# Patient Record
Sex: MALE | Race: WHITE | NOT HISPANIC OR LATINO | Employment: OTHER | URBAN - METROPOLITAN AREA
[De-identification: names, ages, dates, MRNs, and addresses within clinical notes are randomized per-mention and may not be internally consistent; named-entity substitution may affect disease eponyms.]

---

## 2017-01-09 ENCOUNTER — PROCEDURE VISIT (OUTPATIENT)
Dept: DERMATOLOGY | Facility: CLINIC | Age: 72
End: 2017-01-09
Payer: MEDICARE

## 2017-01-09 VITALS
DIASTOLIC BLOOD PRESSURE: 81 MMHG | BODY MASS INDEX: 20.4 KG/M2 | HEART RATE: 71 BPM | WEIGHT: 130 LBS | HEIGHT: 67 IN | SYSTOLIC BLOOD PRESSURE: 167 MMHG

## 2017-01-09 DIAGNOSIS — D22.30 ATYPICAL NEVUS OF FACE: ICD-10-CM

## 2017-01-09 DIAGNOSIS — D03.30 MELANOMA IN SITU OF FACE: Primary | ICD-10-CM

## 2017-01-09 PROCEDURE — 88305 TISSUE EXAM BY PATHOLOGIST: CPT | Performed by: PATHOLOGY

## 2017-01-09 PROCEDURE — 11643 EXC F/E/E/N/L MAL+MRG 2.1-3: CPT | Mod: S$PBB,,, | Performed by: DERMATOLOGY

## 2017-01-09 PROCEDURE — 99499 UNLISTED E&M SERVICE: CPT | Mod: S$PBB,,, | Performed by: DERMATOLOGY

## 2017-01-09 PROCEDURE — 11643 EXC F/E/E/N/L MAL+MRG 2.1-3: CPT | Mod: PBBFAC | Performed by: DERMATOLOGY

## 2017-01-09 RX ORDER — OXYCODONE AND ACETAMINOPHEN 5; 325 MG/1; MG/1
1 TABLET ORAL
Qty: 20 TABLET | Refills: 0 | Status: SHIPPED | OUTPATIENT
Start: 2017-01-09 | End: 2018-05-14

## 2017-01-09 NOTE — PROGRESS NOTES
PROCEDURE: 360-degree continuous en face margin-controlled excision with rush permanent sections and subsequent delayed repair    REFERRING MD: Bell Padilla M.D.    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 The patient elected to proceed.    PROCEDURE:  The area involved was marked out with a surgical marking pen. The area of left mandible was prepped, draped, and anesthetized in the usual sterile fashion. Lesional tissue was carefully marked with at least 5 mm margins of clinically normal skin in all directions. An excision was performed with a #15 blade carried down completely through the dermis to the deep subcutaneous tissue plane. A short suture was placed superiorly at the 12 o'clock position and a long suture was placed posteriorly at the 3 o'clock position. The lesion was then removed in total and submitted for histological margin evaluation. Then, electrocoagulation was used to obtain hemostasis. Blood loss was minimal.    The patient tolerated the procedure well.    The area was cleaned and dressed appropriately and the patient was given wound care instructions, as well as an appointment for follow-up evaluation tomorrow for possible further excision pending  "pathology report. Patient was placed on Percocet 5 prn postop pain.    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   "

## 2017-01-09 NOTE — MR AVS SNAPSHOT
[facility] - Dermatology Surgery  [address]  [facility] [address]  Phone: [phone]  Fax: [phone]                  Gabriele JUAREZ Back   2017 8:00 AM   Appointment    Description:  Male : 1945   Provider:  Nick Villarreal MD   Department:  [facility] - Dermatology Surgery                To Do List           Future Appointments        Provider Department Dept Phone    2017 8:00 AM Nick Villarreal MD [facility] - Dermatology Surgery [phone]      Goals (5 Years of Data)     None      [facility]     [facility] -  Assistance  Registered nurses in the [facility] provide clinical advisement, health education, appointment booking, and other advisory services.  Call for this free service at [phone].             Medications           Message regarding Medications     Verify the changes and/or additions to your medication regime listed below are the same as discussed with your clinician today.  If any of these changes or additions are incorrect, please notify your healthcare provider.             Verify that the below list of medications is an accurate representation of the medications you are currently taking.  If none reported, the list may be blank. If incorrect, please contact your healthcare provider. Carry this list with you in case of emergency.           Current Medications     multivitamin capsule Take 1 capsule by mouth once daily.    oxycodone-acetaminophen (PERCOCET) 5-325 mg per tablet Take 1 tablet by mouth every 4 to 6 hours as needed for Pain.           Clinical Reference Information           Allergies as of 2017     No Known Allergies      Immunizations Administered on Date of Encounter - 2017     None

## 2017-01-10 ENCOUNTER — PROCEDURE VISIT (OUTPATIENT)
Dept: DERMATOLOGY | Facility: CLINIC | Age: 72
End: 2017-01-10
Payer: MEDICARE

## 2017-01-10 VITALS — SYSTOLIC BLOOD PRESSURE: 151 MMHG | DIASTOLIC BLOOD PRESSURE: 85 MMHG | HEART RATE: 71 BPM

## 2017-01-10 DIAGNOSIS — D03.30 MELANOMA IN SITU OF FACE: Primary | ICD-10-CM

## 2017-01-10 PROCEDURE — 13132 CMPLX RPR F/C/C/M/N/AX/G/H/F: CPT | Mod: 79,PBBFAC | Performed by: DERMATOLOGY

## 2017-01-10 PROCEDURE — 99499 UNLISTED E&M SERVICE: CPT | Mod: S$PBB,,, | Performed by: DERMATOLOGY

## 2017-01-10 PROCEDURE — 13132 CMPLX RPR F/C/C/M/N/AX/G/H/F: CPT | Mod: 79,S$PBB,, | Performed by: DERMATOLOGY

## 2017-01-10 NOTE — PROGRESS NOTES
PROCEDURE: Complex Linear Repair    INDICATION: Status post 360 degree continuous en face excision with 5 mm margins for melanoma in situ performed yesterday. Verbal results of residual melanoma in situ but with clear margins per Dr. Pike today.    REFERRING MD: Bell Padilla M.D.    SURGEON: Nick Villarreal MD    ASSISTANTS: Paula Pedroza PA-C and Mikala Ga, Surg Tech    ANESTHETIC: 8 cc 1% Lidocaine with Epinephrine 1:100,000, buffered    SURGICAL PREP: Hibiclens    LOCATION: left mandible    DEFECT SIZE: 1.8 x 2.1 cm    WOUND REPAIR/DISPOSITION:  After the patient's tumor had been completely removed yesterday, a repair of the surgical defect was undertaken today. The patient was returned to the operating suite where the area of left mandible was prepped, draped, and anesthetized in the usual sterile fashion. The wound was widely undermined in all directions. Then, electrocoagulation was used to obtain meticulous hemostasis. 5-0 Vicryl buried vertical mattress sutures were placed into the subcutaneous and dermal plane to close the wound and jigar the cutaneous wound edge. Bilateral dog ears were identified and were removed by a standard Burow's triangle technique. The cutaneous wound edges were closed using interrupted 5-0 Prolene suture.    The patient tolerated the procedure well.    The area was cleaned and dressed appropriately and the patient was given wound care instructions, as well as appointment for follow-up evaluation. Pt already has Percocet 5 prn postop pain.    LENGTH OF REPAIR: 4.7 cm    Vitals:    01/10/17 1009 01/10/17 1117   BP: (!) 153/86 (!) 151/85   BP Location: Left arm Right arm   Patient Position: Sitting Sitting   BP Method: Automatic Automatic   Pulse: 75 71

## 2017-01-10 NOTE — MR AVS SNAPSHOT
[facility] - Dermatology Surgery  [address]  [facility] [address]  Phone: [phone]  Fax: [phone]                  Gabriele Hayes   1/10/2017 10:10 AM   Procedure visit    Description:  Male : 1945   Provider:  Nick Villarreal MD   Department:  [facility] - Dermatology Surgery           Reason for Visit     Melanoma                To Do List           Goals (5 Years of Data)     None      [facility]     [facility] -  Assistance  Registered nurses in the [facility] provide clinical advisement, health education, appointment booking, and other advisory services.  Call for this free service at [phone].             Medications           Message regarding Medications     Verify the changes and/or additions to your medication regime listed below are the same as discussed with your clinician today.  If any of these changes or additions are incorrect, please notify your healthcare provider.             Verify that the below list of medications is an accurate representation of the medications you are currently taking.  If none reported, the list may be blank. If incorrect, please contact your healthcare provider. Carry this list with you in case of emergency.           Current Medications     multivitamin capsule Take 1 capsule by mouth once daily.    oxycodone-acetaminophen (PERCOCET) 5-325 mg per tablet Take 1 tablet by mouth every 4 to 6 hours as needed for Pain.           Clinical Reference Information           Vital Signs - Last Recorded  Most recent update: 1/10/2017 11:17 AM by Mikala Ga    BP Pulse                (!) 151/85 (BP Location: Right arm, Patient Position: Sitting, BP Method: Automatic) 71          Blood Pressure          Most Recent Value    BP  (!)  151/85      Allergies as of 1/10/2017     No Known Allergies      Immunizations Administered on Date of Encounter - 1/10/2017     None

## 2017-01-10 NOTE — Clinical Note
Estelita- Will need letter to Dr Padilla when path report becomes available. Will need to state in that letter that pathology report revealed residual melanoma in situ instead of severely dysplastic nevus and that all margins are negative.

## 2017-01-12 ENCOUNTER — PATIENT MESSAGE (OUTPATIENT)
Dept: ADMINISTRATIVE | Facility: OTHER | Age: 72
End: 2017-01-12

## 2017-01-17 ENCOUNTER — OFFICE VISIT (OUTPATIENT)
Dept: DERMATOLOGY | Facility: CLINIC | Age: 72
End: 2017-01-17
Payer: MEDICARE

## 2017-01-17 DIAGNOSIS — Z09 POSTOP CHECK: Primary | ICD-10-CM

## 2017-01-17 PROCEDURE — 99212 OFFICE O/P EST SF 10 MIN: CPT | Mod: PBBFAC | Performed by: DERMATOLOGY

## 2017-01-17 PROCEDURE — 99999 PR PBB SHADOW E&M-EST. PATIENT-LVL II: CPT | Mod: PBBFAC,,, | Performed by: DERMATOLOGY

## 2017-01-17 PROCEDURE — 99024 POSTOP FOLLOW-UP VISIT: CPT | Mod: ,,, | Performed by: DERMATOLOGY

## 2017-01-17 NOTE — PROGRESS NOTES
71 y.o. male patient is here for suture removal following surgery.    Patient reports no problems with L mandible.    WOUND PE:  The L mandible sutures intact. Wound healing well. Good skin edges. No signs or symptoms of infection.    IMPRESSION:  Healing operative site from Status post 360 degree continuous en face excision with 5 mm margins for melanoma in situ, L mandible with CLC, postop day # 7.    PLAN:  Sutures removed today. Steri-strips applied.  D/c wound care.  Keep moist with Aquaphor.    RTC:  In 3 months with Dr Padilla for skin check or sooner if new concern arises.

## 2018-05-10 ENCOUNTER — TELEPHONE (OUTPATIENT)
Dept: DERMATOLOGY | Facility: CLINIC | Age: 73
End: 2018-05-10

## 2018-05-10 NOTE — TELEPHONE ENCOUNTER
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 Pt verbally confirmed appointment date and time.

## 2018-05-10 NOTE — TELEPHONE ENCOUNTER
----- Message from Rayna Gilbert sent at 5/10/2018  2:10 PM CDT -----  Contact: patient   Please call above patient need to speak with the nurse

## 2018-05-10 NOTE — TELEPHONE ENCOUNTER
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

## 2018-05-14 ENCOUNTER — PROCEDURE VISIT (OUTPATIENT)
Dept: DERMATOLOGY | Facility: CLINIC | Age: 73
End: 2018-05-14
Payer: MEDICARE

## 2018-05-14 VITALS
HEART RATE: 61 BPM | HEIGHT: 67 IN | WEIGHT: 130 LBS | SYSTOLIC BLOOD PRESSURE: 155 MMHG | BODY MASS INDEX: 20.4 KG/M2 | DIASTOLIC BLOOD PRESSURE: 79 MMHG

## 2018-05-14 DIAGNOSIS — C43.59 MALIGNANT MELANOMA OF BACK: Primary | ICD-10-CM

## 2018-05-14 PROCEDURE — 11604 EXC TR-EXT MAL+MARG 3.1-4 CM: CPT | Mod: 51,S$PBB,, | Performed by: DERMATOLOGY

## 2018-05-14 PROCEDURE — 88305 TISSUE EXAM BY PATHOLOGIST: CPT | Performed by: PATHOLOGY

## 2018-05-14 PROCEDURE — 13102 CMPLX RPR TRUNK ADDL 5CM/<: CPT | Mod: S$PBB,,, | Performed by: DERMATOLOGY

## 2018-05-14 PROCEDURE — 13101 CMPLX RPR TRUNK 2.6-7.5 CM: CPT | Mod: S$PBB,,, | Performed by: DERMATOLOGY

## 2018-05-14 PROCEDURE — 11604 EXC TR-EXT MAL+MARG 3.1-4 CM: CPT | Mod: PBBFAC | Performed by: DERMATOLOGY

## 2018-05-14 PROCEDURE — 99499 UNLISTED E&M SERVICE: CPT | Mod: S$PBB,,, | Performed by: DERMATOLOGY

## 2018-05-14 PROCEDURE — 13101 CMPLX RPR TRUNK 2.6-7.5 CM: CPT | Mod: PBBFAC | Performed by: DERMATOLOGY

## 2018-05-14 PROCEDURE — 13102 CMPLX RPR TRUNK ADDL 5CM/<: CPT | Mod: PBBFAC | Performed by: DERMATOLOGY

## 2018-05-14 RX ORDER — OXYCODONE AND ACETAMINOPHEN 5; 325 MG/1; MG/1
1 TABLET ORAL
Qty: 20 TABLET | Refills: 0 | Status: SHIPPED | OUTPATIENT
Start: 2018-05-14 | End: 2018-05-28

## 2018-05-14 RX ORDER — CEPHALEXIN 500 MG/1
500 CAPSULE ORAL 3 TIMES DAILY
Qty: 30 CAPSULE | Refills: 0 | Status: SHIPPED | OUTPATIENT
Start: 2018-05-14 | End: 2018-05-24

## 2018-05-14 NOTE — PROGRESS NOTES
PROCEDURE: Wide local excision of melanoma with complex linear repair    REFERRING MD: Bell Padilla M.D.    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 Patient verified location by looking at photo taken prior to procedure.     INITIAL LESION SIZE: 0.8 x 1.1 cm    EXCISED MARGINS: 1 cm    DEFECT SIZE: 2.8 x 3.4 cm    PREPARATION:  The diagnosis, procedure, alternatives, benefits and risks, including but not limited to: drug reactions, pain, scar or cosmetic defect, local sensation disturbances, and/or recurrence of present condition were explained to the patient. The patient elected to proceed.    PROCEDURE:  The area involved by the melanoma was marked out with a surgical marking pen. The area of right upper back was prepped, draped, and anesthetized in the usual sterile fashion. Lesional tissue was carefully marked with at least 1 cm margins of clinically normal skin in all directions. A fusiform elliptical excision was performed with a #10 blade carried down completely through the dermis to the deep subcutaneous tissue plane just above fascia/muscle. A short suture was placed superiorly at the 12 o' clock and a long suture was placed right laterally at the 3 o' clock position. The lesion was then removed in total and submitted for histological margin evaluation. The operative site was then extensively undermined in all directions in the subcutaneous tissue plane. Then, electrocoagulation was used to obtain hemostasis. Blood loss was minimal. The deep subcutaneous tissue plane was closed first with 3-0 Vicryl buried vertical mattress sutures in order to  "close off dead space. Then, a more superficial layer of 3-0 Vicryl and 4-0 Vicryl buried vertical mattress sutures was placed in the mid-dermal and subcutaneous tissue planes in order to approximate the wound edges. The cutaneous wound edges were closed using interrupted 4-0 Prolene sutures.    The patient tolerated the procedure well.    The area was cleaned and dressed appropriately and the patient was given wound care instructions, as well as an appointment for follow-up evaluation. Patient was placed on Percocet 5 prn postop pain and Keflex 500 mg TID x 10 days.    LENGTH OF REPAIR: 10.5 cm    Vitals:    05/14/18 1126 05/14/18 1304   BP: (!) 151/79 (!) 155/79   BP Location: Left arm    Patient Position: Sitting    BP Method: Medium (Automatic)    Pulse: 77 61   Weight: 59 kg (130 lb)    Height: 5' 7" (1.702 m)          "

## 2018-05-17 ENCOUNTER — TELEPHONE (OUTPATIENT)
Dept: DERMATOLOGY | Facility: CLINIC | Age: 73
End: 2018-05-17

## 2018-05-17 NOTE — TELEPHONE ENCOUNTER
Pt had surgery to remove MM on R upper back on 5/14/18. Pt was taking Keflex and c/o stomach cramps and insomnia. I advised him to d/c antibiotics and to make sure the area is properly cleaned daily and covered. Pt states the area looks great and denies any issues or problems.

## 2018-05-28 ENCOUNTER — OFFICE VISIT (OUTPATIENT)
Dept: DERMATOLOGY | Facility: CLINIC | Age: 73
End: 2018-05-28
Payer: MEDICARE

## 2018-05-28 DIAGNOSIS — Z09 POSTOP CHECK: Primary | ICD-10-CM

## 2018-05-28 PROCEDURE — 99999 PR PBB SHADOW E&M-EST. PATIENT-LVL II: CPT | Mod: PBBFAC,,, | Performed by: DERMATOLOGY

## 2018-05-28 PROCEDURE — 99024 POSTOP FOLLOW-UP VISIT: CPT | Mod: POP,,, | Performed by: DERMATOLOGY

## 2018-05-28 PROCEDURE — 99212 OFFICE O/P EST SF 10 MIN: CPT | Mod: PBBFAC | Performed by: DERMATOLOGY

## 2018-05-28 NOTE — PROGRESS NOTES
72 y.o. male patient is here for suture removal following wide local excision.    Patient reports no problems.    WOUND PE:  The R upper back sutures intact. Wound healing well. Good skin edges. No signs or symptoms of infection.  (+) dermatitis at periphery of incision.    IMPRESSION:  Healing operative site, Lentigo Maligna Melanoma R upper back s/p Wide local excision with CLC, postop day #14, margins negative.    PLAN:  Sutures removed today. No steri strips given dermatitis.   Continue wound care.  Keep moist with Aquaphor.    RTC:  In 3 months with Bell Padilla M.D. for skin check or sooner if new concern arises.

## 2018-10-04 ENCOUNTER — TELEPHONE (OUTPATIENT)
Dept: DERMATOLOGY | Facility: CLINIC | Age: 73
End: 2018-10-04

## 2018-10-04 NOTE — TELEPHONE ENCOUNTER
Pt was informed of bx proven BCC left shoulder. Pt was scheduled for Mohs sx on 10/17/18, at 1:00. Pt verbally confirmed appointment date and time.

## 2018-10-17 ENCOUNTER — PROCEDURE VISIT (OUTPATIENT)
Dept: DERMATOLOGY | Facility: CLINIC | Age: 73
End: 2018-10-17
Payer: MEDICARE

## 2018-10-17 VITALS
BODY MASS INDEX: 20.4 KG/M2 | WEIGHT: 130 LBS | DIASTOLIC BLOOD PRESSURE: 79 MMHG | HEART RATE: 58 BPM | HEIGHT: 67 IN | SYSTOLIC BLOOD PRESSURE: 160 MMHG

## 2018-10-17 DIAGNOSIS — C44.619 BASAL CELL CARCINOMA (BCC) OF LEFT UPPER EXTREMITY: Primary | ICD-10-CM

## 2018-10-17 PROCEDURE — 13121 CMPLX RPR S/A/L 2.6-7.5 CM: CPT | Mod: 51,S$PBB,, | Performed by: DERMATOLOGY

## 2018-10-17 PROCEDURE — 99499 UNLISTED E&M SERVICE: CPT | Mod: S$PBB,,, | Performed by: DERMATOLOGY

## 2018-10-17 PROCEDURE — 13121 CMPLX RPR S/A/L 2.6-7.5 CM: CPT | Mod: PBBFAC | Performed by: DERMATOLOGY

## 2018-10-17 PROCEDURE — 17313 MOHS 1 STAGE T/A/L: CPT | Mod: PBBFAC | Performed by: DERMATOLOGY

## 2018-10-17 PROCEDURE — 17313 MOHS 1 STAGE T/A/L: CPT | Mod: S$PBB,,, | Performed by: DERMATOLOGY

## 2018-10-17 NOTE — PROGRESS NOTES
PROCEDURE: Mohs' Micrographic Surgery    INDICATION: Tumors with aggressive clinical behavior (rapidly growing, greater than 1 cm in diameter). Tumor with ill-defined borders. Tumor with aggressive histopathology. Aggressive histopathology including sclerosing, morpheaform/infiltrating, micronodular, superficial multicentric, poorly differentiated, basosquamous, or perineural invasion. In patient with proven history of difficult or aggressive skin cancer.    REFERRING MD: Bell Padilla M.D.    CASE NUMBER:     ANESTHETIC: 6 cc 0.5% Lidocaine with Epi 1:200,000 mixed 1:1 with 0.5% Bupivacaine    SURGICAL PREP: Hibiclens    SURGEON: Nick Villarreal MD    ASSISTANTS: Paula Pedroza PA-C    PREOPERATIVE DIAGNOSIS: basal cell carcinoma    POSTOPERATIVE DIAGNOSIS: basal cell carcinoma    PATHOLOGIC DIAGNOSIS: basal cell carcinoma- nodular, micronodular    HISTOLOGY OF SPECIMENS IN FIRST STAGE:   Tumor Type: No tumor seen.    STAGES OF MOHS' SURGERY PERFORMED: 1    TUMOR-FREE PLANE ACHIEVED: Yes    HEMOSTASIS: electrocoagulation     SPECIMENS: 2     LOCATION: left (anterior) shoulder. Patient verified location with hand held mirror.    INITIAL LESION SIZE: 0.8 x 1.2 cm    FINAL DEFECT SIZE: 1.4 x 2.0 cm    WOUND REPAIR/DISPOSITION: The patient tolerated Mohs' Micrographic Surgery for a basal cell carcinoma very well. When the tumor was completely removed, a repair of the surgical defect was undertaken.      PROCEDURE: Complex Linear Repair    INDICATION: Status post Mohs' Micrographic Surgery for basal cell carcinoma.    CASE NUMBER:     SURGEON: Nick Villarreal MD    ASSISTANTS: Paula Pedroza PA-C and Mikala Ga, Surg Tech    ANESTHETIC: 3 cc 1% Lidocaine with Epinephrine 1:100,000    SURGICAL PREP: Hibiclens    LOCATION: left (anterior) shoulder    DEFECT SIZE: 1.4 x 2.0 cm    WOUND REPAIR/DISPOSITION:  After the patient's carcinoma had been completely removed with Mohs' Micrographic Surgery, a repair of the  "surgical defect was undertaken. The patient was returned to the operating suite where the area of left anterior shoulder was prepped, draped, and anesthetized in the usual sterile fashion. The wound was widely undermined in all directions. Then, electrocoagulation was used to obtain meticulous hemostasis. 4-0 Vicryl buried vertical mattress sutures were placed into the subcutaneous and dermal plane to close the wound and jigar the cutaneous wound edge. Bilateral dog ears were identified and were removed by a standard Burow's triangle technique. The cutaneous wound edges were closed using interrupted 4-0 Prolene suture.    The patient tolerated the procedure well.    The area was cleaned and dressed appropriately and the patient was given wound care instructions, as well as appointment for follow-up evaluation. Pt already has at home Percocet 5 prn pain.    LENGTH OF REPAIR: 4 cm    Vitals:    10/17/18 1246 10/17/18 1503   BP: (!) 160/82 (!) 160/79   BP Location:  Right arm   Patient Position:  Sitting   Pulse: 74 (!) 58   Weight: 59 kg (130 lb)    Height: 5' 7" (1.702 m)          "

## 2018-10-31 ENCOUNTER — OFFICE VISIT (OUTPATIENT)
Dept: DERMATOLOGY | Facility: CLINIC | Age: 73
End: 2018-10-31
Payer: MEDICARE

## 2018-10-31 DIAGNOSIS — Z09 POSTOP CHECK: Primary | ICD-10-CM

## 2018-10-31 PROCEDURE — 99999 PR PBB SHADOW E&M-EST. PATIENT-LVL II: CPT | Mod: PBBFAC,,, | Performed by: DERMATOLOGY

## 2018-10-31 PROCEDURE — 99024 POSTOP FOLLOW-UP VISIT: CPT | Mod: POP,,, | Performed by: DERMATOLOGY

## 2018-10-31 PROCEDURE — 99212 OFFICE O/P EST SF 10 MIN: CPT | Mod: PBBFAC | Performed by: DERMATOLOGY

## 2018-10-31 NOTE — PROGRESS NOTES
72 y.o. male patient is here for suture removal following Mohs' surgery.    Patient reports no problems with left (anterior) shoulder.    WOUND PE:  The left (anterior) shoulder sutures intact. Wound healing well. Good skin edges. No signs or symptoms of infection.    IMPRESSION:  Healing operative site from Mohs' surgery BCC, left (anterior) shoulder s/p Mohs with CLC, postop day # 14.    PLAN:  Sutures removed today. Steri-strips applied.  Continue wound care.  Keep moist with Aquaphor.    RTC:  In 3-6 months with Bell Padilla M.D. for skin check or sooner if new concern arises.

## 2019-01-30 ENCOUNTER — TELEPHONE (OUTPATIENT)
Dept: DERMATOLOGY | Facility: CLINIC | Age: 74
End: 2019-01-30

## 2019-01-30 NOTE — TELEPHONE ENCOUNTER
Pt was called and informed of bx proven BCC right chest and BCC left superior upper lip. Established pt. Pt was scheduled for 2/19/19 at 8:00. Pt verbally confirmed appointment date and time.

## 2019-01-31 ENCOUNTER — TELEPHONE (OUTPATIENT)
Dept: DERMATOLOGY | Facility: CLINIC | Age: 74
End: 2019-01-31

## 2019-01-31 NOTE — TELEPHONE ENCOUNTER
Pt was rescheduled for Mohs sx on 3/12/19 at 8:00 for BCC right chest and BCC left superior upper lip. Pt verbally confirmed new appointment date and time.

## 2019-02-03 ENCOUNTER — PATIENT MESSAGE (OUTPATIENT)
Dept: DERMATOLOGY | Facility: CLINIC | Age: 74
End: 2019-02-03

## 2019-03-12 ENCOUNTER — PROCEDURE VISIT (OUTPATIENT)
Dept: DERMATOLOGY | Facility: CLINIC | Age: 74
End: 2019-03-12
Payer: MEDICARE

## 2019-03-12 VITALS
SYSTOLIC BLOOD PRESSURE: 175 MMHG | HEIGHT: 67 IN | BODY MASS INDEX: 20.4 KG/M2 | DIASTOLIC BLOOD PRESSURE: 82 MMHG | HEART RATE: 65 BPM | WEIGHT: 130 LBS

## 2019-03-12 DIAGNOSIS — C44.519 BASAL CELL CARCINOMA OF CHEST: ICD-10-CM

## 2019-03-12 DIAGNOSIS — C44.01 BASAL CELL CARCINOMA, LIP: Primary | ICD-10-CM

## 2019-03-12 PROCEDURE — 13151 CMPLX RPR E/N/E/L 1.1-2.5 CM: CPT | Mod: PBBFAC | Performed by: DERMATOLOGY

## 2019-03-12 PROCEDURE — 17314 MOHS ADDL STAGE T/A/L: CPT | Mod: PBBFAC | Performed by: DERMATOLOGY

## 2019-03-12 PROCEDURE — 99499 UNLISTED E&M SERVICE: CPT | Mod: S$PBB,,, | Performed by: DERMATOLOGY

## 2019-03-12 PROCEDURE — 17312 MOHS ADDL STAGE: CPT | Mod: S$PBB,,, | Performed by: DERMATOLOGY

## 2019-03-12 PROCEDURE — 17312: ICD-10-PCS | Mod: S$PBB,,, | Performed by: DERMATOLOGY

## 2019-03-12 PROCEDURE — 17311 MOHS 1 STAGE H/N/HF/G: CPT | Mod: S$PBB,,, | Performed by: DERMATOLOGY

## 2019-03-12 PROCEDURE — 13101 CMPLX RPR TRUNK 2.6-7.5 CM: CPT | Mod: 51,S$PBB,, | Performed by: DERMATOLOGY

## 2019-03-12 PROCEDURE — 17311: ICD-10-PCS | Mod: S$PBB,,, | Performed by: DERMATOLOGY

## 2019-03-12 PROCEDURE — 17312 MOHS ADDL STAGE: CPT | Mod: PBBFAC | Performed by: DERMATOLOGY

## 2019-03-12 PROCEDURE — 17313: ICD-10-PCS | Mod: S$PBB,,, | Performed by: DERMATOLOGY

## 2019-03-12 PROCEDURE — 17313 MOHS 1 STAGE T/A/L: CPT | Mod: S$PBB,,, | Performed by: DERMATOLOGY

## 2019-03-12 PROCEDURE — 99499 NO LOS: ICD-10-PCS | Mod: S$PBB,,, | Performed by: DERMATOLOGY

## 2019-03-12 PROCEDURE — 13101 CMPLX RPR TRUNK 2.6-7.5 CM: CPT | Mod: 59,PBBFAC | Performed by: DERMATOLOGY

## 2019-03-12 PROCEDURE — 17314 MOHS ADDL STAGE T/A/L: CPT | Mod: S$PBB,,, | Performed by: DERMATOLOGY

## 2019-03-12 PROCEDURE — 13151 PR RECMPL WND LID,NOS,EAR 1.1-2.5 CM: ICD-10-PCS | Mod: S$PBB,,, | Performed by: DERMATOLOGY

## 2019-03-12 PROCEDURE — 13151 CMPLX RPR E/N/E/L 1.1-2.5 CM: CPT | Mod: S$PBB,,, | Performed by: DERMATOLOGY

## 2019-03-12 PROCEDURE — 13101 PR RECMPL WND TRUNK 2.6-7.5 CM: ICD-10-PCS | Mod: 51,S$PBB,, | Performed by: DERMATOLOGY

## 2019-03-12 PROCEDURE — 17311 MOHS 1 STAGE H/N/HF/G: CPT | Mod: PBBFAC | Performed by: DERMATOLOGY

## 2019-03-12 PROCEDURE — 17314: ICD-10-PCS | Mod: S$PBB,,, | Performed by: DERMATOLOGY

## 2019-03-12 PROCEDURE — 17313 MOHS 1 STAGE T/A/L: CPT | Mod: 59,PBBFAC | Performed by: DERMATOLOGY

## 2019-03-12 RX ORDER — CEPHALEXIN 500 MG/1
500 CAPSULE ORAL 3 TIMES DAILY
Qty: 21 CAPSULE | Refills: 0 | Status: SHIPPED | OUTPATIENT
Start: 2019-03-12 | End: 2019-03-12 | Stop reason: CLARIF

## 2019-03-12 RX ORDER — SILDENAFIL 100 MG/1
100 TABLET, FILM COATED ORAL
COMMUNITY
Start: 2018-04-25 | End: 2021-01-06

## 2019-03-12 RX ORDER — TADALAFIL 20 MG/1
20 TABLET ORAL
COMMUNITY
Start: 2018-10-29

## 2019-03-12 NOTE — PROGRESS NOTES
PROCEDURE: Mohs' Micrographic Surgery    INDICATION: Tumor with ill-defined borders. In patient with proven history of difficult or aggressive skin cancer.    REFERRING MD: Bell Padilla M.D.    CASE NUMBER:     ANESTHETIC: 6 cc 0.5% Lidocaine with Epi 1:200,000 mixed 1:1 with 0.5% Bupivacaine    SURGICAL PREP: Hibiclens    SURGEON: Nick Villarreal MD    ASSISTANTS: Paula Pedroza PA-C, Rosina Jackson MA and Mikala Ga, Surg Tech    PREOPERATIVE DIAGNOSIS: basal cell carcinoma    POSTOPERATIVE DIAGNOSIS: basal cell carcinoma    PATHOLOGIC DIAGNOSIS: basal cell carcinoma- nodular, superficial    HISTOLOGY OF SPECIMENS IN FIRST STAGE:   Tumor Type: Tumor seen. Superficial basal cell carcinoma: Foci of basaloid cells with peripheral palisading and focal retraction artifact arising along the dermoepidermal junction and extending into the papillary dermis.   Depth of Invasion: epidermis and dermis  Perineural Invasion: No    HISTOLOGY OF SPECIMENS IN SUBSEQUENT STAGES:  · Tumor Type: No tumor seen.    STAGES OF MOHS' SURGERY PERFORMED: 2    TUMOR-FREE PLANE ACHIEVED: Yes    HEMOSTASIS: electrocoagulation     SPECIMENS: 3 (2 in stage A and 1 in stage B)    LOCATION: right chest. Patient verified location with hand held mirror.    INITIAL LESION SIZE: 0.6 x 0.8 cm    FINAL DEFECT SIZE: 1.0 x 1.7 cm    WOUND REPAIR/DISPOSITION: The patient tolerated Mohs' Micrographic Surgery for a basal cell carcinoma very well. When the tumor was completely removed, a repair of the surgical defect was undertaken.      PROCEDURE: Complex Linear Repair    INDICATION: Status post Mohs' Micrographic Surgery for basal cell carcinoma.    CASE NUMBER:     SURGEON: Nick Villarreal MD    ASSISTANTS: Paula Pedroza PA-C and Mikala Ga, Surg Tech    ANESTHETIC: 2 cc 1% Lidocaine with Epinephrine 1:100,000    SURGICAL PREP: Hibiclens    LOCATION: right chest    DEFECT SIZE: 1.0 x 1.7 cm    WOUND REPAIR/DISPOSITION:  After the patient's  "carcinoma had been completely removed with Mohs' Micrographic Surgery, a repair of the surgical defect was undertaken. The patient was returned to the operating suite where the area of right chest was prepped, draped, and anesthetized in the usual sterile fashion. The wound was widely undermined in all directions. Then, electrocoagulation was used to obtain meticulous hemostasis. 4-0 Vicryl buried vertical mattress sutures were placed into the subcutaneous and dermal plane to close the wound and jigar the cutaneous wound edge. Bilateral dog ears were identified and were removed by a standard Burow's triangle technique. The cutaneous wound edges were closed using a running subcuticular 4-0 Prolene suture.    The patient tolerated the procedure well.    The area was cleaned and dressed appropriately and the patient was given wound care instructions, as well as appointment for follow-up evaluation. Pt already has pain medication at home.    LENGTH OF REPAIR: 2.7 cm    Vitals:    03/12/19 0755 03/12/19 1108   BP: (!) 162/80 (!) 175/82   BP Location: Right arm Right arm   Patient Position: Sitting Sitting   BP Method: Small (Automatic) Small (Automatic)   Pulse: 69 65   Weight: 59 kg (130 lb)    Height: 5' 7" (1.702 m)           PROCEDURE: Mohs' Micrographic Surgery    INDICATION: Location in mask areas of face including central face, nose, eyelids, eyebrows, lips, chin, preauricular, temple, and ear. Biopsy-proven skin cancer of cosmetically and functionally important areas, including head, neck, genital, hand, foot, or areas known for having difficulty in healing, such as the lower anterior legs. Tumor with ill-defined borders. In patient with proven history of difficult or aggressive skin cancer.    REFERRING MD: Bell Padilla M.D.    CASE NUMBER:     ANESTHETIC: 4.5 cc 0.5% Lidocaine with Epi 1:200,000 mixed 1:1 with 0.5% Bupivacaine    SURGICAL PREP: Hibiclens    SURGEON: Nick Villarreal MD    ASSISTANTS: " Paula Pedroza PA-C, Rosina Jackson MA and Mikala Ga, Surg Tech    PREOPERATIVE DIAGNOSIS: basal cell carcinoma    POSTOPERATIVE DIAGNOSIS: basal cell carcinoma    PATHOLOGIC DIAGNOSIS: basal cell carcinoma- micronodular    HISTOLOGY OF SPECIMENS IN FIRST STAGE:   Tumor Type: Tumor seen. Micronodular basal cell carcinoma: Tumor in dermis composed of basaloid cells in small nodular aggregates exhibiting peripheral palisading and retraction artifact.   Depth of Invasion: epidermis and dermis  Perineural Invasion: No    HISTOLOGY OF SPECIMENS IN SUBSEQUENT STAGES:  · Tumor Type: No tumor seen.    STAGES OF MOHS' SURGERY PERFORMED: 2    TUMOR-FREE PLANE ACHIEVED: Yes    HEMOSTASIS: electrocoagulation     SPECIMENS: 3 (2 in stage A and 1 in stage B)    LOCATION: left superior upper lip. Patient verified location with hand held mirror.    INITIAL LESION SIZE: 0.3 x 0.6 cm    FINAL DEFECT SIZE: 0.8 x 1.0 cm    WOUND REPAIR/DISPOSITION: The patient tolerated Mohs' Micrographic Surgery for a basal cell carcinoma very well. When the tumor was completely removed, a repair of the surgical defect was undertaken.      PROCEDURE: Complex Linear Repair    INDICATION: Status post Mohs' Micrographic Surgery for basal cell carcinoma.    CASE NUMBER:     SURGEON: Nick Villarreal MD    ASSISTANTS: Paula Pedroza PA-C and Mikala Ga, Surg Tech    ANESTHETIC: 3 cc 1% Lidocaine with Epinephrine 1:100,000    SURGICAL PREP: Hibiclens    LOCATION: left superior upper lip    DEFECT SIZE: 0.8 x 1.0 cm    WOUND REPAIR/DISPOSITION:  After the patient's carcinoma had been completely removed with Mohs' Micrographic Surgery, a repair of the surgical defect was undertaken. The patient was returned to the operating suite where the area of left superior upper lip was prepped, draped, and anesthetized in the usual sterile fashion. The wound was widely undermined in all directions. Then, electrocoagulation was used to obtain meticulous hemostasis. 5-0  "Vicryl buried vertical mattress sutures were placed into the subcutaneous and dermal plane to close the wound and jigar the cutaneous wound edge. Bilateral dog ears were identified and were removed by a standard Burow's triangle technique. The cutaneous wound edges were closed using interrupted 5-0 Prolene suture.    The patient tolerated the procedure well.    The area was cleaned and dressed appropriately and the patient was given wound care instructions, as well as appointment for follow-up evaluation. Pt already has pain medication at home.    LENGTH OF REPAIR: 2.3 cm    Vitals:    03/12/19 0755 03/12/19 1108   BP: (!) 162/80 (!) 175/82   BP Location: Right arm Right arm   Patient Position: Sitting Sitting   BP Method: Small (Automatic) Small (Automatic)   Pulse: 69 65   Weight: 59 kg (130 lb)    Height: 5' 7" (1.702 m)          "

## 2019-03-19 ENCOUNTER — OFFICE VISIT (OUTPATIENT)
Dept: DERMATOLOGY | Facility: CLINIC | Age: 74
End: 2019-03-19
Payer: MEDICARE

## 2019-03-19 DIAGNOSIS — Z09 POSTOP CHECK: Primary | ICD-10-CM

## 2019-03-19 PROCEDURE — 99024 PR POST-OP FOLLOW-UP VISIT: ICD-10-PCS | Mod: POP,,, | Performed by: DERMATOLOGY

## 2019-03-19 PROCEDURE — 99999 PR PBB SHADOW E&M-EST. PATIENT-LVL II: CPT | Mod: PBBFAC,,, | Performed by: DERMATOLOGY

## 2019-03-19 PROCEDURE — 99212 OFFICE O/P EST SF 10 MIN: CPT | Mod: PBBFAC | Performed by: DERMATOLOGY

## 2019-03-19 PROCEDURE — 99999 PR PBB SHADOW E&M-EST. PATIENT-LVL II: ICD-10-PCS | Mod: PBBFAC,,, | Performed by: DERMATOLOGY

## 2019-03-19 PROCEDURE — 99024 POSTOP FOLLOW-UP VISIT: CPT | Mod: POP,,, | Performed by: DERMATOLOGY

## 2019-03-19 NOTE — PROGRESS NOTES
73 y.o. male patient is here for suture removal following Mohs' surgery.    Patient reports no problems.    WOUND PE:  The L superior upper lip sutures intact. Wound healing well. Good skin edges. No signs or symptoms of infection. Vermilion border intact with good symmetry.    IMPRESSION:  Healing operative site from Mohs' surgery, BCC L superior upper lip s/p Mohs with CLC, postop day #7.    PLAN:  Sutures removed today.  Continue wound care.  Keep moist with Aquaphor.    RTC:  In 2 week for suture removal on R chest.

## 2019-04-02 ENCOUNTER — OFFICE VISIT (OUTPATIENT)
Dept: DERMATOLOGY | Facility: CLINIC | Age: 74
End: 2019-04-02
Payer: MEDICARE

## 2019-04-02 DIAGNOSIS — Z09 POSTOP CHECK: Primary | ICD-10-CM

## 2019-04-02 PROCEDURE — 99999 PR PBB SHADOW E&M-EST. PATIENT-LVL II: ICD-10-PCS | Mod: PBBFAC,,, | Performed by: DERMATOLOGY

## 2019-04-02 PROCEDURE — 99024 PR POST-OP FOLLOW-UP VISIT: ICD-10-PCS | Mod: POP,,, | Performed by: DERMATOLOGY

## 2019-04-02 PROCEDURE — 99999 PR PBB SHADOW E&M-EST. PATIENT-LVL II: CPT | Mod: PBBFAC,,, | Performed by: DERMATOLOGY

## 2019-04-02 PROCEDURE — 99024 POSTOP FOLLOW-UP VISIT: CPT | Mod: POP,,, | Performed by: DERMATOLOGY

## 2019-04-02 PROCEDURE — 99212 OFFICE O/P EST SF 10 MIN: CPT | Mod: PBBFAC | Performed by: DERMATOLOGY

## 2019-04-02 NOTE — PROGRESS NOTES
73 y.o. male patient is here for suture removal following Mohs' surgery.    Patient reports no problems right chest.    WOUND PE:  The right chest sutures intact. Wound healing well. Good skin edges. No signs or symptoms of infection.    IMPRESSION:  Healing operative site from Mohs' surgery BCC, right chest s/p Mohs with CLC, postop day # 21.    PLAN:  Sutures removed today. Steri-strips applied.  Continue wound care.  Keep moist with Aquaphor.    RTC:  In 3-6 months with Bell Padilla M.D. for skin check or sooner if new concern arises.

## 2021-01-06 ENCOUNTER — OFFICE VISIT (OUTPATIENT)
Dept: RHEUMATOLOGY | Facility: CLINIC | Age: 76
End: 2021-01-06
Payer: MEDICARE

## 2021-01-06 ENCOUNTER — LAB VISIT (OUTPATIENT)
Dept: LAB | Facility: HOSPITAL | Age: 76
End: 2021-01-06
Attending: INTERNAL MEDICINE
Payer: MEDICARE

## 2021-01-06 VITALS
SYSTOLIC BLOOD PRESSURE: 160 MMHG | HEART RATE: 78 BPM | WEIGHT: 121.94 LBS | DIASTOLIC BLOOD PRESSURE: 80 MMHG | HEIGHT: 67 IN | BODY MASS INDEX: 19.14 KG/M2

## 2021-01-06 DIAGNOSIS — R03.0 ELEVATED BLOOD PRESSURE READING: ICD-10-CM

## 2021-01-06 DIAGNOSIS — M75.02 BILATERAL ADHESIVE CAPSULITIS OF SHOULDERS: ICD-10-CM

## 2021-01-06 DIAGNOSIS — M25.50 PAIN IN JOINT INVOLVING MULTIPLE SITES: ICD-10-CM

## 2021-01-06 DIAGNOSIS — M75.01 BILATERAL ADHESIVE CAPSULITIS OF SHOULDERS: ICD-10-CM

## 2021-01-06 DIAGNOSIS — M79.10 MYALGIA: ICD-10-CM

## 2021-01-06 DIAGNOSIS — M25.50 PAIN IN JOINT INVOLVING MULTIPLE SITES: Primary | ICD-10-CM

## 2021-01-06 DIAGNOSIS — R79.82 ELEVATED C-REACTIVE PROTEIN (CRP): ICD-10-CM

## 2021-01-06 DIAGNOSIS — Z55.9 EDUCATIONAL CIRCUMSTANCE: ICD-10-CM

## 2021-01-06 LAB
ALBUMIN SERPL BCP-MCNC: 3.9 G/DL (ref 3.5–5.2)
ALP SERPL-CCNC: 66 U/L (ref 55–135)
ALT SERPL W/O P-5'-P-CCNC: 15 U/L (ref 10–44)
ANION GAP SERPL CALC-SCNC: 10 MMOL/L (ref 8–16)
AST SERPL-CCNC: 18 U/L (ref 10–40)
BASOPHILS # BLD AUTO: 0.08 K/UL (ref 0–0.2)
BASOPHILS NFR BLD: 0.8 % (ref 0–1.9)
BILIRUB SERPL-MCNC: 0.4 MG/DL (ref 0.1–1)
BUN SERPL-MCNC: 19 MG/DL (ref 8–23)
CALCIUM SERPL-MCNC: 9.8 MG/DL (ref 8.7–10.5)
CHLORIDE SERPL-SCNC: 106 MMOL/L (ref 95–110)
CO2 SERPL-SCNC: 24 MMOL/L (ref 23–29)
CREAT SERPL-MCNC: 0.8 MG/DL (ref 0.5–1.4)
CRP SERPL-MCNC: 13.4 MG/L (ref 0–8.2)
DIFFERENTIAL METHOD: ABNORMAL
EOSINOPHIL # BLD AUTO: 0.1 K/UL (ref 0–0.5)
EOSINOPHIL NFR BLD: 0.7 % (ref 0–8)
ERYTHROCYTE [DISTWIDTH] IN BLOOD BY AUTOMATED COUNT: 14.9 % (ref 11.5–14.5)
ERYTHROCYTE [SEDIMENTATION RATE] IN BLOOD BY WESTERGREN METHOD: 7 MM/HR (ref 0–23)
EST. GFR  (AFRICAN AMERICAN): >60 ML/MIN/1.73 M^2
EST. GFR  (NON AFRICAN AMERICAN): >60 ML/MIN/1.73 M^2
GLUCOSE SERPL-MCNC: 98 MG/DL (ref 70–110)
HCT VFR BLD AUTO: 42.2 % (ref 40–54)
HGB BLD-MCNC: 13.5 G/DL (ref 14–18)
IMM GRANULOCYTES # BLD AUTO: 0.06 K/UL (ref 0–0.04)
IMM GRANULOCYTES NFR BLD AUTO: 0.6 % (ref 0–0.5)
LYMPHOCYTES # BLD AUTO: 2 K/UL (ref 1–4.8)
LYMPHOCYTES NFR BLD: 20.1 % (ref 18–48)
MCH RBC QN AUTO: 30.2 PG (ref 27–31)
MCHC RBC AUTO-ENTMCNC: 32 G/DL (ref 32–36)
MCV RBC AUTO: 94 FL (ref 82–98)
MONOCYTES # BLD AUTO: 0.7 K/UL (ref 0.3–1)
MONOCYTES NFR BLD: 6.7 % (ref 4–15)
NEUTROPHILS # BLD AUTO: 7.1 K/UL (ref 1.8–7.7)
NEUTROPHILS NFR BLD: 71.1 % (ref 38–73)
NRBC BLD-RTO: 0 /100 WBC
PLATELET # BLD AUTO: 332 K/UL (ref 150–350)
PMV BLD AUTO: 9.3 FL (ref 9.2–12.9)
POTASSIUM SERPL-SCNC: 4.2 MMOL/L (ref 3.5–5.1)
PROT SERPL-MCNC: 7.4 G/DL (ref 6–8.4)
RBC # BLD AUTO: 4.47 M/UL (ref 4.6–6.2)
SODIUM SERPL-SCNC: 140 MMOL/L (ref 136–145)
WBC # BLD AUTO: 9.93 K/UL (ref 3.9–12.7)

## 2021-01-06 PROCEDURE — 99205 PR OFFICE/OUTPT VISIT, NEW, LEVL V, 60-74 MIN: ICD-10-PCS | Mod: S$PBB,,, | Performed by: INTERNAL MEDICINE

## 2021-01-06 PROCEDURE — 85025 COMPLETE CBC W/AUTO DIFF WBC: CPT

## 2021-01-06 PROCEDURE — 36415 COLL VENOUS BLD VENIPUNCTURE: CPT

## 2021-01-06 PROCEDURE — 86140 C-REACTIVE PROTEIN: CPT

## 2021-01-06 PROCEDURE — 99213 OFFICE O/P EST LOW 20 MIN: CPT | Mod: PBBFAC | Performed by: INTERNAL MEDICINE

## 2021-01-06 PROCEDURE — 99205 OFFICE O/P NEW HI 60 MIN: CPT | Mod: S$PBB,,, | Performed by: INTERNAL MEDICINE

## 2021-01-06 PROCEDURE — 99999 PR PBB SHADOW E&M-EST. PATIENT-LVL III: ICD-10-PCS | Mod: PBBFAC,,, | Performed by: INTERNAL MEDICINE

## 2021-01-06 PROCEDURE — 99999 PR PBB SHADOW E&M-EST. PATIENT-LVL III: CPT | Mod: PBBFAC,,, | Performed by: INTERNAL MEDICINE

## 2021-01-06 PROCEDURE — 85652 RBC SED RATE AUTOMATED: CPT

## 2021-01-06 PROCEDURE — 80053 COMPREHEN METABOLIC PANEL: CPT

## 2021-01-06 SDOH — SOCIAL DETERMINANTS OF HEALTH (SDOH): PROBLEMS RELATED TO EDUCATION AND LITERACY, UNSPECIFIED: Z55.9

## 2021-01-06 ASSESSMENT — ROUTINE ASSESSMENT OF PATIENT INDEX DATA (RAPID3)
PSYCHOLOGICAL DISTRESS SCORE: 2.2
FATIGUE SCORE: 0
PATIENT GLOBAL ASSESSMENT SCORE: 5
PAIN SCORE: 5
MDHAQ FUNCTION SCORE: 0.9
AM STIFFNESS SCORE: 1, YES
WHEN YOU AWAKENED IN THE MORNING OVER THE LAST WEEK, PLEASE INDICATE THE AMOUNT OF TIME IT TAKES UNTIL YOU ARE AS LIMBER AS YOU WILL BE FOR THE DAY: 1 HOUR
PATIENT GLOBAL ASSESSMENT SCORE: 5
AM STIFFNESS SCORE: 1, YES
MDHAQ FUNCTION SCORE: 0.9
PSYCHOLOGICAL DISTRESS SCORE: 2.2
TOTAL RAPID3 SCORE: 4.33
TOTAL RAPID3 SCORE: 4.33
FATIGUE SCORE: 0
PAIN SCORE: 5

## 2021-01-13 ENCOUNTER — PATIENT MESSAGE (OUTPATIENT)
Dept: RHEUMATOLOGY | Facility: CLINIC | Age: 76
End: 2021-01-13

## 2021-02-11 ENCOUNTER — PATIENT MESSAGE (OUTPATIENT)
Dept: RHEUMATOLOGY | Facility: CLINIC | Age: 76
End: 2021-02-11

## 2021-02-15 ENCOUNTER — PATIENT MESSAGE (OUTPATIENT)
Dept: RHEUMATOLOGY | Facility: CLINIC | Age: 76
End: 2021-02-15

## 2021-02-23 ENCOUNTER — PATIENT MESSAGE (OUTPATIENT)
Dept: RHEUMATOLOGY | Facility: CLINIC | Age: 76
End: 2021-02-23

## 2021-07-26 ENCOUNTER — PATIENT MESSAGE (OUTPATIENT)
Dept: OPTOMETRY | Facility: CLINIC | Age: 76
End: 2021-07-26

## 2021-08-25 ENCOUNTER — TELEPHONE (OUTPATIENT)
Dept: DERMATOLOGY | Facility: CLINIC | Age: 76
End: 2021-08-25

## 2021-09-23 ENCOUNTER — PROCEDURE VISIT (OUTPATIENT)
Dept: DERMATOLOGY | Facility: CLINIC | Age: 76
End: 2021-09-23
Payer: MEDICARE

## 2021-09-23 VITALS
BODY MASS INDEX: 18.99 KG/M2 | WEIGHT: 121 LBS | HEART RATE: 70 BPM | SYSTOLIC BLOOD PRESSURE: 170 MMHG | DIASTOLIC BLOOD PRESSURE: 79 MMHG | HEIGHT: 67 IN

## 2021-09-23 DIAGNOSIS — C44.319 BASAL CELL CARCINOMA OF LEFT CHEEK: ICD-10-CM

## 2021-09-23 DIAGNOSIS — C44.729 SQUAMOUS CELL CARCINOMA OF LEFT LOWER LEG: Primary | ICD-10-CM

## 2021-09-23 PROCEDURE — 99499 NO LOS: ICD-10-PCS | Mod: S$PBB,,, | Performed by: DERMATOLOGY

## 2021-09-23 PROCEDURE — 13131 CMPLX RPR F/C/C/M/N/AX/G/H/F: CPT | Mod: S$PBB,51,, | Performed by: DERMATOLOGY

## 2021-09-23 PROCEDURE — 13121 CMPLX RPR S/A/L 2.6-7.5 CM: CPT | Mod: S$PBB,51,, | Performed by: DERMATOLOGY

## 2021-09-23 PROCEDURE — 17311 MOHS 1 STAGE H/N/HF/G: CPT | Mod: PBBFAC | Performed by: DERMATOLOGY

## 2021-09-23 PROCEDURE — 17313: ICD-10-PCS | Mod: S$PBB,,, | Performed by: DERMATOLOGY

## 2021-09-23 PROCEDURE — 99499 UNLISTED E&M SERVICE: CPT | Mod: S$PBB,,, | Performed by: DERMATOLOGY

## 2021-09-23 PROCEDURE — 17313 MOHS 1 STAGE T/A/L: CPT | Mod: S$PBB,,, | Performed by: DERMATOLOGY

## 2021-09-23 PROCEDURE — 17313 MOHS 1 STAGE T/A/L: CPT | Mod: 59,PBBFAC | Performed by: DERMATOLOGY

## 2021-09-23 PROCEDURE — 17311: ICD-10-PCS | Mod: S$PBB,,, | Performed by: DERMATOLOGY

## 2021-09-23 PROCEDURE — 13121 PR RECMPL WND SCALP,EXTR 2.6-7.5 CM: ICD-10-PCS | Mod: S$PBB,51,, | Performed by: DERMATOLOGY

## 2021-09-23 PROCEDURE — 13131 PR RECMPL WND HEAD,FAC,HAND 1.1-2.5 CM: ICD-10-PCS | Mod: S$PBB,51,, | Performed by: DERMATOLOGY

## 2021-09-23 PROCEDURE — 17311 MOHS 1 STAGE H/N/HF/G: CPT | Mod: S$PBB,,, | Performed by: DERMATOLOGY

## 2021-09-23 PROCEDURE — 13131 CMPLX RPR F/C/C/M/N/AX/G/H/F: CPT | Mod: PBBFAC | Performed by: DERMATOLOGY

## 2021-09-23 PROCEDURE — 13121 CMPLX RPR S/A/L 2.6-7.5 CM: CPT | Mod: 59,PBBFAC | Performed by: DERMATOLOGY

## 2021-09-23 RX ORDER — ASPIRIN 81 MG/1
81 TABLET ORAL DAILY
COMMUNITY

## 2021-09-23 RX ORDER — AMOXICILLIN 500 MG
CAPSULE ORAL DAILY
COMMUNITY

## 2021-09-23 RX ORDER — IBUPROFEN 400 MG/1
400 TABLET ORAL EVERY 6 HOURS PRN
COMMUNITY

## 2021-09-23 RX ORDER — CHOLECALCIFEROL (VITAMIN D3) 25 MCG
1000 TABLET ORAL DAILY
COMMUNITY

## 2021-09-23 RX ORDER — AMLODIPINE AND OLMESARTAN MEDOXOMIL 5; 20 MG/1; MG/1
1 TABLET ORAL DAILY
COMMUNITY
End: 2021-10-05 | Stop reason: DRUGHIGH

## 2021-09-23 RX ORDER — CEPHALEXIN 500 MG/1
500 CAPSULE ORAL 3 TIMES DAILY
Qty: 30 CAPSULE | Refills: 0 | Status: SHIPPED | OUTPATIENT
Start: 2021-09-23 | End: 2021-10-03

## 2021-09-28 ENCOUNTER — TELEPHONE (OUTPATIENT)
Dept: OPTOMETRY | Facility: CLINIC | Age: 76
End: 2021-09-28

## 2021-09-30 ENCOUNTER — OFFICE VISIT (OUTPATIENT)
Dept: DERMATOLOGY | Facility: CLINIC | Age: 76
End: 2021-09-30
Payer: MEDICARE

## 2021-09-30 DIAGNOSIS — Z09 POSTOP CHECK: Primary | ICD-10-CM

## 2021-09-30 PROCEDURE — 99024 POSTOP FOLLOW-UP VISIT: CPT | Mod: POP,,, | Performed by: DERMATOLOGY

## 2021-09-30 PROCEDURE — 99999 PR PBB SHADOW E&M-EST. PATIENT-LVL I: ICD-10-PCS | Mod: PBBFAC,,, | Performed by: DERMATOLOGY

## 2021-09-30 PROCEDURE — 99024 PR POST-OP FOLLOW-UP VISIT: ICD-10-PCS | Mod: POP,,, | Performed by: DERMATOLOGY

## 2021-09-30 PROCEDURE — 99211 OFF/OP EST MAY X REQ PHY/QHP: CPT | Mod: PBBFAC | Performed by: DERMATOLOGY

## 2021-09-30 PROCEDURE — 99999 PR PBB SHADOW E&M-EST. PATIENT-LVL I: CPT | Mod: PBBFAC,,, | Performed by: DERMATOLOGY

## 2021-10-05 ENCOUNTER — OFFICE VISIT (OUTPATIENT)
Dept: OPTOMETRY | Facility: CLINIC | Age: 76
End: 2021-10-05
Payer: MEDICARE

## 2021-10-05 DIAGNOSIS — H21.233 PIGMENTARY DISPERSION SYNDROME, BILATERAL: Primary | ICD-10-CM

## 2021-10-05 DIAGNOSIS — G43.109 OCULAR MIGRAINE: ICD-10-CM

## 2021-10-05 DIAGNOSIS — H52.4 HYPEROPIA WITH PRESBYOPIA OF BOTH EYES: ICD-10-CM

## 2021-10-05 DIAGNOSIS — H40.013 OPEN ANGLE WITH BORDERLINE FINDINGS OF BOTH EYES: ICD-10-CM

## 2021-10-05 DIAGNOSIS — H25.13 NUCLEAR SCLEROSIS OF BOTH EYES: ICD-10-CM

## 2021-10-05 DIAGNOSIS — H52.03 HYPEROPIA WITH PRESBYOPIA OF BOTH EYES: ICD-10-CM

## 2021-10-05 PROCEDURE — 99213 OFFICE O/P EST LOW 20 MIN: CPT | Mod: PBBFAC | Performed by: OPTOMETRIST

## 2021-10-05 PROCEDURE — 92004 PR EYE EXAM, NEW PATIENT,COMPREHESV: ICD-10-PCS | Mod: S$PBB,,, | Performed by: OPTOMETRIST

## 2021-10-05 PROCEDURE — 92250 COLOR FUNDUS PHOTOGRAPHY - OU - BOTH EYES: ICD-10-PCS | Mod: 26,S$PBB,, | Performed by: OPTOMETRIST

## 2021-10-05 PROCEDURE — 92004 COMPRE OPH EXAM NEW PT 1/>: CPT | Mod: S$PBB,,, | Performed by: OPTOMETRIST

## 2021-10-05 PROCEDURE — 99999 PR PBB SHADOW E&M-EST. PATIENT-LVL III: ICD-10-PCS | Mod: PBBFAC,,, | Performed by: OPTOMETRIST

## 2021-10-05 PROCEDURE — 92250 FUNDUS PHOTOGRAPHY W/I&R: CPT | Mod: PBBFAC | Performed by: OPTOMETRIST

## 2021-10-05 PROCEDURE — 99999 PR PBB SHADOW E&M-EST. PATIENT-LVL III: CPT | Mod: PBBFAC,,, | Performed by: OPTOMETRIST

## 2021-10-05 RX ORDER — AMLODIPINE AND OLMESARTAN MEDOXOMIL 10; 40 MG/1; MG/1
1 TABLET ORAL DAILY
COMMUNITY
Start: 2021-10-01

## 2021-10-05 RX ORDER — AMLODIPINE BESYLATE 5 MG/1
5 TABLET ORAL DAILY
COMMUNITY
Start: 2021-06-03 | End: 2021-10-05 | Stop reason: ALTCHOICE

## 2021-10-07 ENCOUNTER — PROCEDURE VISIT (OUTPATIENT)
Dept: DERMATOLOGY | Facility: CLINIC | Age: 76
End: 2021-10-07
Payer: MEDICARE

## 2021-10-07 VITALS
WEIGHT: 121 LBS | HEART RATE: 67 BPM | DIASTOLIC BLOOD PRESSURE: 70 MMHG | BODY MASS INDEX: 18.99 KG/M2 | SYSTOLIC BLOOD PRESSURE: 136 MMHG | HEIGHT: 67 IN

## 2021-10-07 DIAGNOSIS — C44.319 BASAL CELL CARCINOMA OF LEFT CHEEK: Primary | ICD-10-CM

## 2021-10-07 PROCEDURE — 17311: ICD-10-PCS | Mod: S$PBB,,, | Performed by: DERMATOLOGY

## 2021-10-07 PROCEDURE — 17311 MOHS 1 STAGE H/N/HF/G: CPT | Mod: 79,PBBFAC | Performed by: DERMATOLOGY

## 2021-10-07 PROCEDURE — 13132 PR RECMPL WND HEAD,FAC,HAND 2.6-7.5 CM: ICD-10-PCS | Mod: S$PBB,51,, | Performed by: DERMATOLOGY

## 2021-10-07 PROCEDURE — 17312 MOHS ADDL STAGE: CPT | Mod: S$PBB,,, | Performed by: DERMATOLOGY

## 2021-10-07 PROCEDURE — 17312: ICD-10-PCS | Mod: S$PBB,,, | Performed by: DERMATOLOGY

## 2021-10-07 PROCEDURE — 17312 MOHS ADDL STAGE: CPT | Mod: PBBFAC | Performed by: DERMATOLOGY

## 2021-10-07 PROCEDURE — 13132 CMPLX RPR F/C/C/M/N/AX/G/H/F: CPT | Mod: PBBFAC | Performed by: DERMATOLOGY

## 2021-10-07 PROCEDURE — 17311 MOHS 1 STAGE H/N/HF/G: CPT | Mod: S$PBB,,, | Performed by: DERMATOLOGY

## 2021-10-07 PROCEDURE — 99499 NO LOS: ICD-10-PCS | Mod: S$PBB,,, | Performed by: DERMATOLOGY

## 2021-10-07 PROCEDURE — 13132 CMPLX RPR F/C/C/M/N/AX/G/H/F: CPT | Mod: S$PBB,51,, | Performed by: DERMATOLOGY

## 2021-10-07 PROCEDURE — 99499 UNLISTED E&M SERVICE: CPT | Mod: S$PBB,,, | Performed by: DERMATOLOGY

## 2021-10-14 ENCOUNTER — OFFICE VISIT (OUTPATIENT)
Dept: DERMATOLOGY | Facility: CLINIC | Age: 76
End: 2021-10-14
Payer: MEDICARE

## 2021-10-14 DIAGNOSIS — Z09 POSTOP CHECK: Primary | ICD-10-CM

## 2021-10-14 PROCEDURE — 99999 PR PBB SHADOW E&M-EST. PATIENT-LVL I: CPT | Mod: PBBFAC,,, | Performed by: DERMATOLOGY

## 2021-10-14 PROCEDURE — 99999 PR PBB SHADOW E&M-EST. PATIENT-LVL I: ICD-10-PCS | Mod: PBBFAC,,, | Performed by: DERMATOLOGY

## 2021-10-14 PROCEDURE — 99024 POSTOP FOLLOW-UP VISIT: CPT | Mod: POP,,, | Performed by: DERMATOLOGY

## 2021-10-14 PROCEDURE — 99211 OFF/OP EST MAY X REQ PHY/QHP: CPT | Mod: PBBFAC | Performed by: DERMATOLOGY

## 2021-10-14 PROCEDURE — 99024 PR POST-OP FOLLOW-UP VISIT: ICD-10-PCS | Mod: POP,,, | Performed by: DERMATOLOGY

## 2021-10-26 ENCOUNTER — TELEPHONE (OUTPATIENT)
Dept: DERMATOLOGY | Facility: CLINIC | Age: 76
End: 2021-10-26
Payer: MEDICARE

## 2021-10-27 ENCOUNTER — OFFICE VISIT (OUTPATIENT)
Dept: DERMATOLOGY | Facility: CLINIC | Age: 76
End: 2021-10-27
Payer: MEDICARE

## 2021-10-27 DIAGNOSIS — C44.729 SQUAMOUS CELL CARCINOMA OF SKIN OF LEFT LOWER EXTREMITY: Primary | ICD-10-CM

## 2021-10-27 PROCEDURE — 87077 CULTURE AEROBIC IDENTIFY: CPT | Performed by: DERMATOLOGY

## 2021-10-27 PROCEDURE — 87186 SC STD MICRODIL/AGAR DIL: CPT | Performed by: DERMATOLOGY

## 2021-10-27 PROCEDURE — 87070 CULTURE OTHR SPECIMN AEROBIC: CPT | Performed by: DERMATOLOGY

## 2021-10-27 PROCEDURE — 99212 OFFICE O/P EST SF 10 MIN: CPT | Mod: S$PBB,,, | Performed by: DERMATOLOGY

## 2021-10-27 PROCEDURE — 99212 PR OFFICE/OUTPT VISIT, EST, LEVL II, 10-19 MIN: ICD-10-PCS | Mod: S$PBB,,, | Performed by: DERMATOLOGY

## 2021-10-29 LAB — BACTERIA SPEC AEROBE CULT: ABNORMAL

## 2021-10-29 RX ORDER — CIPROFLOXACIN 500 MG/1
500 TABLET ORAL EVERY 12 HOURS
Qty: 28 TABLET | Refills: 0 | Status: SHIPPED | OUTPATIENT
Start: 2021-10-29 | End: 2021-11-12

## 2021-11-30 ENCOUNTER — OFFICE VISIT (OUTPATIENT)
Dept: DERMATOLOGY | Facility: CLINIC | Age: 76
End: 2021-11-30
Payer: MEDICARE

## 2021-11-30 DIAGNOSIS — C44.729 SQUAMOUS CELL CARCINOMA OF SKIN OF LEFT LOWER EXTREMITY: Primary | ICD-10-CM

## 2021-11-30 PROCEDURE — 99211 OFF/OP EST MAY X REQ PHY/QHP: CPT | Mod: PBBFAC | Performed by: DERMATOLOGY

## 2021-11-30 PROCEDURE — 99212 OFFICE O/P EST SF 10 MIN: CPT | Mod: S$PBB,,, | Performed by: DERMATOLOGY

## 2021-11-30 PROCEDURE — 99212 PR OFFICE/OUTPT VISIT, EST, LEVL II, 10-19 MIN: ICD-10-PCS | Mod: S$PBB,,, | Performed by: DERMATOLOGY

## 2021-11-30 PROCEDURE — 99999 PR PBB SHADOW E&M-EST. PATIENT-LVL I: ICD-10-PCS | Mod: PBBFAC,,, | Performed by: DERMATOLOGY

## 2021-11-30 PROCEDURE — 99999 PR PBB SHADOW E&M-EST. PATIENT-LVL I: CPT | Mod: PBBFAC,,, | Performed by: DERMATOLOGY

## 2021-12-01 ENCOUNTER — TELEPHONE (OUTPATIENT)
Dept: DERMATOLOGY | Facility: CLINIC | Age: 76
End: 2021-12-01
Payer: MEDICARE

## 2021-12-03 ENCOUNTER — PATIENT MESSAGE (OUTPATIENT)
Dept: DERMATOLOGY | Facility: CLINIC | Age: 76
End: 2021-12-03
Payer: MEDICARE

## 2021-12-08 ENCOUNTER — TELEPHONE (OUTPATIENT)
Dept: DERMATOLOGY | Facility: CLINIC | Age: 76
End: 2021-12-08
Payer: MEDICARE

## 2021-12-09 ENCOUNTER — PATIENT MESSAGE (OUTPATIENT)
Dept: WOUND CARE | Facility: CLINIC | Age: 76
End: 2021-12-09
Payer: MEDICARE

## 2021-12-11 ENCOUNTER — PATIENT MESSAGE (OUTPATIENT)
Dept: WOUND CARE | Facility: CLINIC | Age: 76
End: 2021-12-11
Payer: MEDICARE

## 2021-12-15 ENCOUNTER — OFFICE VISIT (OUTPATIENT)
Dept: WOUND CARE | Facility: CLINIC | Age: 76
End: 2021-12-15
Payer: MEDICARE

## 2021-12-15 VITALS
HEART RATE: 82 BPM | BODY MASS INDEX: 19.17 KG/M2 | HEIGHT: 67 IN | DIASTOLIC BLOOD PRESSURE: 70 MMHG | WEIGHT: 122.13 LBS | TEMPERATURE: 98 F | SYSTOLIC BLOOD PRESSURE: 153 MMHG

## 2021-12-15 DIAGNOSIS — C44.722 SQUAMOUS CELL CARCINOMA, LEG, RIGHT: Primary | ICD-10-CM

## 2021-12-15 PROCEDURE — 99203 PR OFFICE/OUTPT VISIT, NEW, LEVL III, 30-44 MIN: ICD-10-PCS | Mod: 25,S$PBB,, | Performed by: SURGERY

## 2021-12-15 PROCEDURE — 97597 DBRDMT OPN WND 1ST 20 CM/<: CPT | Mod: PBBFAC | Performed by: SURGERY

## 2021-12-15 PROCEDURE — 99203 OFFICE O/P NEW LOW 30 MIN: CPT | Mod: 25,S$PBB,, | Performed by: SURGERY

## 2021-12-15 PROCEDURE — 99999 PR PBB SHADOW E&M-EST. PATIENT-LVL IV: ICD-10-PCS | Mod: PBBFAC,,, | Performed by: SURGERY

## 2021-12-15 PROCEDURE — 99214 OFFICE O/P EST MOD 30 MIN: CPT | Mod: PBBFAC,25 | Performed by: SURGERY

## 2021-12-15 PROCEDURE — 97597 DBRDMT OPN WND 1ST 20 CM/<: CPT | Mod: S$PBB,,, | Performed by: SURGERY

## 2021-12-15 PROCEDURE — 97597 PR DEBRIDEMENT OPEN WOUND 20 SQ CM<: ICD-10-PCS | Mod: S$PBB,,, | Performed by: SURGERY

## 2021-12-15 PROCEDURE — 99999 PR PBB SHADOW E&M-EST. PATIENT-LVL IV: CPT | Mod: PBBFAC,,, | Performed by: SURGERY

## 2021-12-19 ENCOUNTER — PATIENT MESSAGE (OUTPATIENT)
Dept: DERMATOLOGY | Facility: CLINIC | Age: 76
End: 2021-12-19
Payer: MEDICARE

## 2021-12-20 ENCOUNTER — PATIENT MESSAGE (OUTPATIENT)
Dept: WOUND CARE | Facility: CLINIC | Age: 76
End: 2021-12-20
Payer: MEDICARE

## 2021-12-27 ENCOUNTER — PATIENT MESSAGE (OUTPATIENT)
Dept: WOUND CARE | Facility: CLINIC | Age: 76
End: 2021-12-27
Payer: MEDICARE

## 2022-01-04 ENCOUNTER — CLINICAL SUPPORT (OUTPATIENT)
Dept: WOUND CARE | Facility: CLINIC | Age: 77
End: 2022-01-04
Payer: MEDICARE

## 2022-01-04 VITALS
TEMPERATURE: 97 F | DIASTOLIC BLOOD PRESSURE: 71 MMHG | WEIGHT: 127.63 LBS | HEART RATE: 84 BPM | SYSTOLIC BLOOD PRESSURE: 139 MMHG | HEIGHT: 67 IN | BODY MASS INDEX: 20.03 KG/M2

## 2022-01-04 DIAGNOSIS — C44.722 SQUAMOUS CELL CARCINOMA, LEG, RIGHT: Primary | ICD-10-CM

## 2022-01-04 PROCEDURE — 99999 PR PBB SHADOW E&M-EST. PATIENT-LVL IV: CPT | Mod: PBBFAC,,,

## 2022-01-04 PROCEDURE — 99999 PR PBB SHADOW E&M-EST. PATIENT-LVL IV: ICD-10-PCS | Mod: PBBFAC,,,

## 2022-01-04 PROCEDURE — 99214 OFFICE O/P EST MOD 30 MIN: CPT | Mod: PBBFAC

## 2022-01-04 NOTE — PATIENT INSTRUCTIONS
Daily dressing change  Remove dressing and shower daily using mild soap (dove), no tub baths no soaking  Irrigate wound for 5 minutes while in shower  Pat dry thoroughly   May apply thin layer of calmoseptine barrier cream to rciardo-wound skin  Apply medi-honey gel directly to wound bed  Cover with cotton gauze and secure with island dressing   Apply tubigrip compression daily or compression stocking until patient can purchase circaid compression from [employer]  Patient warn to only use one compression stocking  Follow up two weeks

## 2022-01-04 NOTE — PROGRESS NOTES
Subjective:       Patient ID: Gabriele Hayes is a 76 y.o. male.    Chief Complaint: No chief complaint on file.    HPI  Review of Systems    Objective:      Physical Exam    Assessment:       1. Squamous cell carcinoma, leg, right           Wound Left lower;medial Leg (Active)        Pre-existing:    Primary Wound Type:    Side: Left   Orientation: lower;medial   Location: Leg   Wound Number:    Ankle-Brachial Index:    Pulses:    Removal Indication and Assessment:    Wound Outcome:    (Retired) Wound Type:    (Retired) Wound Length (cm):    (Retired) Wound Width (cm):    (Retired) Depth (cm):    Wound Description (Comments):    Removal Indications:    Wound Image    01/04/22 1621   Dressing Appearance Dry;Intact;Clean 01/04/22 1621   Drainage Amount Small 01/04/22 1621   Drainage Characteristics/Odor Yellow;Tan 01/04/22 1621   Red (%), Wound Tissue Color 96 % 01/04/22 1621   Yellow (%), Wound Tissue Color 4 % 01/04/22 1621   Periwound Area Intact;Dry;Pink 01/04/22 1621   Wound Length (cm) 1.7 cm 01/04/22 1621   Wound Width (cm) 1 cm 01/04/22 1621   Wound Surface Area (cm^2) 1.7 cm^2 01/04/22 1621   Care Cleansed with:;Wound cleanser 01/04/22 1621   Periwound Care Skin barrier film applied 01/04/22 1621   Compression Other (see comments) 01/04/22 1621   Dressing Change Due 01/18/22 01/04/22 1621     Gabriele was seen in the clinic room placed in a sitting position with legs elevated in treatment chair.  Dressing removed and right leg cleaned with wound cleanser.  Dr. Clarke removed caked on Medi-honey with Q-tip applicator; patient tolerated procedure well with no complaints of pain - before and after photo taken.  Skin prep to ricardo-wound skin, ight medi-honey gel applied directly to wound bed, covered with cotton gauze, secured with island dressing.  Tubigrip size E applied for compression.            Plan:       Daily dressing change  Remove dressing and shower daily using mild soap (dove), no tub baths no  soaking  Irrigate wound for 5 minutes while in shower  Pat dry thoroughly   May apply thin layer of calmoseptine barrier cream to ricardo-wound skin  Apply medi-honey gel directly to wound bed  Cover with cotton gauze and secure with island dressing   Apply tubigrip compression daily or compression stocking until patient can purchase circaid compression from [employer]  Patient warn to only use one compression stocking  Follow up two weeks

## 2022-01-05 ENCOUNTER — TELEPHONE (OUTPATIENT)
Dept: WOUND CARE | Facility: CLINIC | Age: 77
End: 2022-01-05
Payer: MEDICARE

## 2022-01-05 NOTE — TELEPHONE ENCOUNTER
----- Message from Juan Felix sent at 1/5/2022  3:08 PM CST -----  Contact: @[phone]  Patient calling to schedule a NP appt from the referral, celestina call

## 2022-01-05 NOTE — TELEPHONE ENCOUNTER
Called patient back and he stated that he never called for an appointment today.  He was given his appointment on yesterday when he left wound care to come back in two weeks.

## 2022-01-06 ENCOUNTER — OFFICE VISIT (OUTPATIENT)
Dept: DERMATOLOGY | Facility: CLINIC | Age: 77
End: 2022-01-06
Payer: MEDICARE

## 2022-01-06 DIAGNOSIS — C44.729 SQUAMOUS CELL CARCINOMA OF LEG, LEFT: Primary | ICD-10-CM

## 2022-01-06 PROCEDURE — 99999 PR PBB SHADOW E&M-EST. PATIENT-LVL I: CPT | Mod: PBBFAC,,, | Performed by: DERMATOLOGY

## 2022-01-06 PROCEDURE — 99999 PR PBB SHADOW E&M-EST. PATIENT-LVL I: ICD-10-PCS | Mod: PBBFAC,,, | Performed by: DERMATOLOGY

## 2022-01-06 PROCEDURE — 99211 OFF/OP EST MAY X REQ PHY/QHP: CPT | Mod: PBBFAC | Performed by: DERMATOLOGY

## 2022-01-06 PROCEDURE — 99213 PR OFFICE/OUTPT VISIT, EST, LEVL III, 20-29 MIN: ICD-10-PCS | Mod: S$PBB,,, | Performed by: DERMATOLOGY

## 2022-01-06 PROCEDURE — 99213 OFFICE O/P EST LOW 20 MIN: CPT | Mod: S$PBB,,, | Performed by: DERMATOLOGY

## 2022-01-06 NOTE — PROGRESS NOTES
76 y.o. male patient is here for wound check after surgery.    Patient reports that he has been seeing wound care for past few weeks, using medihoney now. Not wearing compression socks today but states he has been wearing daily. Last seen wound care 2 days ago.    WOUND PE:  The left medial lower leg is healing well with good granulation tissue, 75% re-epithelialization, much improved. Decreased leg edema from last visit, although some is still present.     IMPRESSION:  Healing operative site from Mohs' surgery L medial lower leg s/p Mohs with CLC, postop week # 13, postop course complicated by wound dehiscence one month after procedure, wound culture (+) for Klebsiella, healing improved s/p Cipro and compression wraps from wound care, now almost all healed.     PLAN:    Continue wound care as directed by Wound Care clinic (medihoney and compression sock)  Stressed importance of wearing one compression sock daily, no need for 2 on top of each other  Will likely be healed in another 10 days  Pt to see wound care in 2 weeks at which point it should be all healed  Send in photo in 2-3 weeks to confirm healing as no need to see both us and wound care at this time since almost healed    Disc skin cancer prevention - nicotinamide 500 mg bid   Call if any other issues arise    RTC:  Prn with us and 3 months with Dr Padilla for skin check

## 2022-01-12 ENCOUNTER — OFFICE VISIT (OUTPATIENT)
Dept: OPTOMETRY | Facility: CLINIC | Age: 77
End: 2022-01-12
Payer: MEDICARE

## 2022-01-12 DIAGNOSIS — H21.233 PIGMENTARY DISPERSION SYNDROME, BILATERAL: Primary | ICD-10-CM

## 2022-01-12 DIAGNOSIS — H40.013 OPEN ANGLE WITH BORDERLINE FINDINGS OF BOTH EYES: ICD-10-CM

## 2022-01-12 PROCEDURE — 92012 PR EYE EXAM, EST PATIENT,INTERMED: ICD-10-PCS | Mod: S$PBB,,, | Performed by: OPTOMETRIST

## 2022-01-12 PROCEDURE — 99999 PR PBB SHADOW E&M-EST. PATIENT-LVL III: ICD-10-PCS | Mod: PBBFAC,,, | Performed by: OPTOMETRIST

## 2022-01-12 PROCEDURE — 92012 INTRM OPH EXAM EST PATIENT: CPT | Mod: S$PBB,,, | Performed by: OPTOMETRIST

## 2022-01-12 PROCEDURE — 99213 OFFICE O/P EST LOW 20 MIN: CPT | Mod: PBBFAC | Performed by: OPTOMETRIST

## 2022-01-12 PROCEDURE — 76514 PR  US, EYE, FOR CORNEAL THICKNESS: ICD-10-PCS | Mod: 26,S$PBB,, | Performed by: OPTOMETRIST

## 2022-01-12 PROCEDURE — 76514 ECHO EXAM OF EYE THICKNESS: CPT | Mod: 26,S$PBB,, | Performed by: OPTOMETRIST

## 2022-01-12 PROCEDURE — 76514 ECHO EXAM OF EYE THICKNESS: CPT | Mod: PBBFAC | Performed by: OPTOMETRIST

## 2022-01-12 PROCEDURE — 92133 CPTRZD OPH DX IMG PST SGM ON: CPT | Mod: PBBFAC | Performed by: OPTOMETRIST

## 2022-01-12 PROCEDURE — 99999 PR PBB SHADOW E&M-EST. PATIENT-LVL III: CPT | Mod: PBBFAC,,, | Performed by: OPTOMETRIST

## 2022-01-12 PROCEDURE — 92133 POSTERIOR SEGMENT OCT OPTIC NERVE(OCULAR COHERENCE TOMOGRAPHY) - OU - BOTH EYES: ICD-10-PCS | Mod: 26,S$PBB,, | Performed by: OPTOMETRIST

## 2022-01-12 NOTE — PROGRESS NOTES
HPI     Glaucoma Suspect      Additional comments: 3 month IOP ck, pachy, gonio, and OCT              Comments     Last eye exam was 10/5/21 with Dr. Kent.  Patient states no vision changes since last exam.           Last edited by Mariam Noland MA on 1/12/2022  1:10 PM. (History)            Assessment /Plan     For exam results, see Encounter Report.    Pigmentary dispersion syndrome, bilateral  -     OCT - Optic Nerve    Open angle with borderline findings of both eyes  -     OCT - Optic Nerve        1-2. Longstanding history of pigment dispersion syndrome and followed by Dr. Marin. Has never been diagnosed and treated for glaucoma--only suspect.  IOPs normal--low teens.  OCT normal OU.  Continue to monitor with no treatment.    HVF:   OCT: 01/12/22  DFE: 10/05/21  Photos: 10/05/21  Gonio:  Pachy: 531 OD, 528 OS  Initial IOPs: 14 OD, 14 OS  MHx: Pigmentary Dispersion Syndrome  FHx: None          RTC 6 months for routine exam w/ hvf.  If normal consider follow-up once a year.

## 2022-01-18 ENCOUNTER — PATIENT MESSAGE (OUTPATIENT)
Dept: DERMATOLOGY | Facility: CLINIC | Age: 77
End: 2022-01-18
Payer: MEDICARE

## 2022-01-18 ENCOUNTER — CLINICAL SUPPORT (OUTPATIENT)
Dept: WOUND CARE | Facility: CLINIC | Age: 77
End: 2022-01-18
Payer: MEDICARE

## 2022-01-18 VITALS
SYSTOLIC BLOOD PRESSURE: 140 MMHG | HEART RATE: 43 BPM | DIASTOLIC BLOOD PRESSURE: 62 MMHG | TEMPERATURE: 97 F | BODY MASS INDEX: 20.14 KG/M2 | HEIGHT: 67 IN | WEIGHT: 128.31 LBS

## 2022-01-18 DIAGNOSIS — C44.722 SQUAMOUS CELL CARCINOMA, LEG, RIGHT: Primary | ICD-10-CM

## 2022-01-18 PROCEDURE — 99214 OFFICE O/P EST MOD 30 MIN: CPT | Mod: PBBFAC

## 2022-01-18 PROCEDURE — 99999 PR PBB SHADOW E&M-EST. PATIENT-LVL IV: ICD-10-PCS | Mod: PBBFAC,,,

## 2022-01-18 PROCEDURE — 99999 PR PBB SHADOW E&M-EST. PATIENT-LVL IV: CPT | Mod: PBBFAC,,,

## 2022-01-18 RX ORDER — GLUCOSAMINE/CHONDR SU A SOD 167-133 MG
500 CAPSULE ORAL DAILY
COMMUNITY

## 2022-01-18 NOTE — PROGRESS NOTES
Subjective:       Patient ID: Gabriele Hayes is a 76 y.o. male.    Chief Complaint: No chief complaint on file.    HPI  Review of Systems    Objective:      Physical Exam    Assessment:       Squamous Cell Carcinoma leg, left       Wound Left lower;medial Leg (Active)        Pre-existing:    Primary Wound Type:    Side: Left   Orientation: lower;medial   Location: Leg   Wound Number:    Ankle-Brachial Index:    Pulses:    Removal Indication and Assessment:    Wound Outcome:    (Retired) Wound Type:    (Retired) Wound Length (cm):    (Retired) Wound Width (cm):    (Retired) Depth (cm):    Wound Description (Comments):    Removal Indications:    Wound Image    01/04/22 1621   Dressing Appearance Dry;Intact;Clean 01/04/22 1621   Drainage Amount Small 01/04/22 1621   Drainage Characteristics/Odor Yellow;Tan 01/04/22 1621   Red (%), Wound Tissue Color 96 % 01/04/22 1621   Yellow (%), Wound Tissue Color 4 % 01/04/22 1621   Periwound Area Intact;Dry;Pink 01/04/22 1621   Wound Length (cm) 1.7 cm 01/04/22 1621   Wound Width (cm) 1 cm 01/04/22 1621   Wound Surface Area (cm^2) 1.7 cm^2 01/04/22 1621   Care Cleansed with:;Wound cleanser 01/04/22 1621   Periwound Care Skin barrier film applied 01/04/22 1621   Compression Other (see comments) 01/04/22 1621   Dressing Change Due 01/18/22 01/04/22 1621     Correction to note:  Wound on left lower extremity - dressing was applied to left lower extremity wound        Plan:

## 2022-01-18 NOTE — PATIENT INSTRUCTIONS
Daily dressing change  Remove dressing and shower daily using mild soap (dove), no tub baths no soaking  Irrigate wound for 5 minutes while in shower  Pat dry thoroughly   Apply wound hydrogel directly to wound bed and cover with bandaid  Apply compression stocking every morning wear all day and remove at bedtime  Follow up three weeks 02/08/2022 at 1:30 pm

## 2022-01-18 NOTE — PROGRESS NOTES
Subjective:       Patient ID: Gabriele Hayes is a 76 y.o. male.    Chief Complaint: Wound Check (Left medial calf wound )    HPI  Review of Systems    Objective:      Physical Exam    Assessment:       Squamous Cell Carcinoma, leg, left        Wound Left lower;medial Leg (Active)        Pre-existing:    Primary Wound Type:    Side: Left   Orientation: lower;medial   Location: Leg   Wound Number:    Ankle-Brachial Index:    Pulses:    Removal Indication and Assessment:    Wound Outcome:    (Retired) Wound Type:    (Retired) Wound Length (cm):    (Retired) Wound Width (cm):    (Retired) Depth (cm):    Wound Description (Comments):    Removal Indications:    Wound Image   01/18/22 1217   Dressing Appearance Dry;Intact;Clean 01/18/22 1217   Drainage Amount Scant 01/18/22 1217   Drainage Characteristics/Odor Serosanguineous 01/18/22 1217   Red (%), Wound Tissue Color 99 % 01/18/22 1217   Yellow (%), Wound Tissue Color 1 % 01/18/22 1217   Periwound Area Intact;Dry;Pink;Other (see comments) 01/18/22 1217   Wound Length (cm) 0.6 cm 01/18/22 1217   Wound Width (cm) 0.5 cm 01/18/22 1217   Wound Surface Area (cm^2) 0.3 cm^2 01/18/22 1217   Care Cleansed with:;Wound cleanser 01/18/22 1217   Dressing Applied;Hydrogel;Island/border 01/18/22 1217   Periwound Care Skin barrier film applied 01/18/22 1217   Compression Compression Stocking (20-30 mmHg) 01/18/22 1217   Dressing Change Due 02/08/22 01/18/22 1217     Gabriele was seen in the clinic room placed in a sitting position with legs elevated in treatment chair.  Compression hose noted to bilateral lower extremities.  Dressing removed and left leg cleaned with wound cleanser.  Dr. Clarke evaluated wound - verbal order given to apply wound hydrogel to wound base cover with a bandaid and apply topical hydrocortisone cream to ricardo-wound for skin irritation due to adhesive dressing.  Skin prep to ricardo-wound skin, wound hydrogel applied directly to wound bed, covered with an island  dressing.  Compression hose applied to left lower extremity.             Plan:       Left lower leg dressed as detailed above  Daily dressing change  Remove dressing and shower daily using mild soap (dove), no tub baths no soaking  Irrigate wound for 5 minutes while in shower  Pat dry thoroughly   Apply wound hydrogel directly to wound bed and cover with bandaid  Apply compression stocking every morning wear all day and remove at bedtime  Follow up three weeks

## 2022-02-07 ENCOUNTER — CLINICAL SUPPORT (OUTPATIENT)
Dept: WOUND CARE | Facility: CLINIC | Age: 77
End: 2022-02-07
Payer: MEDICARE

## 2022-02-07 VITALS
BODY MASS INDEX: 20.09 KG/M2 | DIASTOLIC BLOOD PRESSURE: 71 MMHG | SYSTOLIC BLOOD PRESSURE: 136 MMHG | WEIGHT: 128 LBS | TEMPERATURE: 98 F | HEIGHT: 67 IN | HEART RATE: 99 BPM

## 2022-02-07 DIAGNOSIS — C44.729 SQUAMOUS CELL CARCINOMA OF LOWER LEG, LEFT: Primary | ICD-10-CM

## 2022-02-07 PROCEDURE — 99214 OFFICE O/P EST MOD 30 MIN: CPT | Mod: PBBFAC

## 2022-02-07 PROCEDURE — 99999 PR PBB SHADOW E&M-EST. PATIENT-LVL IV: ICD-10-PCS | Mod: PBBFAC,,,

## 2022-02-07 PROCEDURE — 99999 PR PBB SHADOW E&M-EST. PATIENT-LVL IV: CPT | Mod: PBBFAC,,,

## 2022-02-07 NOTE — PROGRESS NOTES
Subjective:       Patient ID: Gabriele Hayes is a 76 y.o. male.    Chief Complaint: Wound Check (Left medial calf wound )    HPI  Review of Systems    Objective:      Physical Exam    Assessment:       1. Squamous cell carcinoma of lower leg, left           Wound Left lower;medial Leg (Active)        Pre-existing:    Primary Wound Type:    Side: Left   Orientation: lower;medial   Location: Leg   Wound Number:    Ankle-Brachial Index:    Pulses:    Removal Indication and Assessment:    Wound Outcome:    (Retired) Wound Type:    (Retired) Wound Length (cm):    (Retired) Wound Width (cm):    (Retired) Depth (cm):    Wound Description (Comments):    Removal Indications:    Dressing Appearance Dry;Intact;Clean 02/07/22 1416   Drainage Amount None 02/07/22 1416   Red (%), Wound Tissue Color 100 % 02/07/22 1416   Periwound Area Intact;Dry;Pink;Other (see comments) 02/07/22 1416   Wound Length (cm) 0.5 cm 02/07/22 1416   Wound Width (cm) 0.3 cm 02/07/22 1416   Wound Surface Area (cm^2) 0.15 cm^2 02/07/22 1416   Care Cleansed with:;Wound cleanser 02/07/22 1416   Dressing Applied;Hydrogel;Gauze;Rolled gauze;Other (comment) 02/07/22 1416   Periwound Care Dry periwound area maintained 02/07/22 1416   Compression Compression Stocking (20-30 mmHg) 02/07/22 1416   Dressing Change Due 03/07/22 02/07/22 1416     Gabriele was seen in the clinic room placed in a sitting position with legs elevated in treatment chair.  Compression hose noted to bilateral lower extremities.  Dressing removed and left leg cleaned with wound cleanser.  Dr. Clarke evaluated wound - rash noted to ricardo-wound skin; verbal order given to apply wound hydrogel to wound base cover with cotton gauze, roll gauze lindy secure with paper tape.  Apply topical hydrocortisone cream to ricardo-wound for skin irritation due to adhesive dressing prior to dressing wound.  Compression hose applied to left lower extremity.             Plan:       Left lower leg dressed as detailed  above  Daily dressing change  Remove dressing and shower daily using mild soap (dove), no tub baths no soaking  Irrigate wound for 5 minutes while in shower  Pat dry thoroughly   Apply wound hydrogel directly to wound bed, cover with cotton gauze, roll gauze and secure with paper tape  Apply compression stocking every morning wear all day and remove at bedtime  Apply flexnet to hold dressing in place overnight   Follow up 1 month - 03/07/2022

## 2022-02-07 NOTE — PATIENT INSTRUCTIONS
Daily dressing change  Remove dressing and shower daily using mild soap (dove), no tub baths no soaking  Irrigate wound for 5 minutes while in shower  Pat dry thoroughly   Apply wound hydrogel directly to wound bed, cover with cotton gauze, roll gauze and secure with paper tape  Apply compression stocking every morning wear all day and remove at bedtime  Apply flexnet to hold dressing in place overnight   Follow up 1 month - 03/07/2022

## 2022-02-10 ENCOUNTER — PATIENT MESSAGE (OUTPATIENT)
Dept: DERMATOLOGY | Facility: CLINIC | Age: 77
End: 2022-02-10
Payer: MEDICARE

## 2022-03-07 ENCOUNTER — PATIENT MESSAGE (OUTPATIENT)
Dept: WOUND CARE | Facility: CLINIC | Age: 77
End: 2022-03-07

## 2022-03-07 ENCOUNTER — CLINICAL SUPPORT (OUTPATIENT)
Dept: WOUND CARE | Facility: CLINIC | Age: 77
End: 2022-03-07
Payer: MEDICARE

## 2022-03-07 VITALS
HEART RATE: 131 BPM | SYSTOLIC BLOOD PRESSURE: 117 MMHG | HEIGHT: 67 IN | TEMPERATURE: 98 F | BODY MASS INDEX: 20.1 KG/M2 | DIASTOLIC BLOOD PRESSURE: 63 MMHG | WEIGHT: 128.06 LBS

## 2022-03-07 DIAGNOSIS — C44.729 SQUAMOUS CELL CARCINOMA OF LOWER LEG, LEFT: Primary | ICD-10-CM

## 2022-03-07 PROCEDURE — 99999 PR PBB SHADOW E&M-EST. PATIENT-LVL IV: ICD-10-PCS | Mod: PBBFAC,,,

## 2022-03-07 PROCEDURE — 99214 OFFICE O/P EST MOD 30 MIN: CPT | Mod: PBBFAC

## 2022-03-07 PROCEDURE — 99999 PR PBB SHADOW E&M-EST. PATIENT-LVL IV: CPT | Mod: PBBFAC,,,

## 2022-03-07 RX ORDER — KETOCONAZOLE 20 MG/G
CREAM TOPICAL
COMMUNITY
Start: 2022-02-11 | End: 2023-04-18

## 2022-03-07 NOTE — PATIENT INSTRUCTIONS
Patient instructed to keep area covered for the next two weeks with a dry gauze, roll gauze and paper tape then apply compression stockings  Compression stockings are applied first thing in the morning, worn all day and removed at bedtime.   Make sure to lift and not drag stockings when apply them to the left lower leg  Patient is warm to not vigorously scab or scratch left lower leg  Patient may continue to shower daily using mild soap (dove)  Patient discharged - wound healed; return to clinic PRN

## 2022-03-07 NOTE — PROGRESS NOTES
Subjective:       Patient ID: Gabriele Hayes is a 76 y.o. male.    Chief Complaint: Wound Check (Right leg squamous cell carcinoma /)    HPI  Review of Systems    Objective:      Physical Exam    Assessment:       1. Squamous cell carcinoma of lower leg, left           Wound Left lower;medial Leg (Active)        Pre-existing:    Primary Wound Type:    Side: Left   Orientation: lower;medial   Location: Leg   Wound Number:    Ankle-Brachial Index:    Pulses:    Removal Indication and Assessment:    Wound Outcome:    (Retired) Wound Type:    (Retired) Wound Length (cm):    (Retired) Wound Width (cm):    (Retired) Depth (cm):    Wound Description (Comments):    Removal Indications:    Wound Image   03/07/22 1350   Dressing Appearance Dry;Intact;Clean 03/07/22 1350   Drainage Amount None 03/07/22 1350   Periwound Area Intact;Dry;Pink;Macerated;Other (see comments) 03/07/22 1350   Wound Edges Approximated;Other (see comments) 03/07/22 1350   Care Cleansed with:;Wound cleanser 03/07/22 1350   Dressing Applied;Gauze;Rolled gauze 03/07/22 1350   Compression Compression Stocking (20-30 mmHg) 03/07/22 1350     Waylon was seen in the clinic treatment room placed in a sitting position with legs elevated in treatment chair.  Compression hose noted to bilateral lower extremities.  Dressing removed and left leg cleaned with wound cleanser.  Dr. Clarke evaluated wound to left medial lower leg- wound healed, edges approximated and closed.  Patient states he has been using Ketaconazole cream to the rash noted to the anterior shin of the left leg.  Dr. Clarke instructed the patient to stop using the Ketaconazole cream because it is an antifungal cream used on the feet for foot fungus.  Dry gauze, roll gauze and paper tape applied to left medial leg.  Compression hose applied to left lower extremity.           Plan:       Left lower leg dressed as detailed above  Patient instructed to keep area covered for the next two weeks with a dry  gauze, roll gauze and paper tape then apply compression stockings  Compression stockings are applied first thing in the morning, worn all day and removed at bedtime.   Make sure to lift and not drag stockings when apply them to the left lower leg  Patient is warm to not vigorously scab or scratch left lower leg  Patient may continue to shower daily using mild soap (dove)  Patient discharged - wound healed; return to clinic PRN

## 2022-03-08 ENCOUNTER — PATIENT MESSAGE (OUTPATIENT)
Dept: DERMATOLOGY | Facility: CLINIC | Age: 77
End: 2022-03-08
Payer: MEDICARE

## 2022-05-26 ENCOUNTER — OFFICE VISIT (OUTPATIENT)
Dept: OPTOMETRY | Facility: CLINIC | Age: 77
End: 2022-05-26
Payer: MEDICARE

## 2022-05-26 DIAGNOSIS — H21.233 PIGMENTARY DISPERSION SYNDROME, BILATERAL: Primary | ICD-10-CM

## 2022-05-26 DIAGNOSIS — H52.4 HYPEROPIA WITH PRESBYOPIA OF BOTH EYES: ICD-10-CM

## 2022-05-26 DIAGNOSIS — H25.13 NUCLEAR SCLEROSIS OF BOTH EYES: ICD-10-CM

## 2022-05-26 DIAGNOSIS — H52.03 HYPEROPIA WITH PRESBYOPIA OF BOTH EYES: ICD-10-CM

## 2022-05-26 DIAGNOSIS — H40.013 OPEN ANGLE WITH BORDERLINE FINDINGS OF BOTH EYES: ICD-10-CM

## 2022-05-26 PROCEDURE — 92015 PR REFRACTION: ICD-10-PCS | Mod: ,,, | Performed by: OPTOMETRIST

## 2022-05-26 PROCEDURE — 92083 EXTENDED VISUAL FIELD XM: CPT | Mod: PBBFAC | Performed by: OPTOMETRIST

## 2022-05-26 PROCEDURE — 99999 PR PBB SHADOW E&M-EST. PATIENT-LVL III: CPT | Mod: PBBFAC,,, | Performed by: OPTOMETRIST

## 2022-05-26 PROCEDURE — 92083 HUMPHREY VISUAL FIELD - OU - BOTH EYES: ICD-10-PCS | Mod: 26,S$PBB,, | Performed by: OPTOMETRIST

## 2022-05-26 PROCEDURE — 92015 DETERMINE REFRACTIVE STATE: CPT | Mod: ,,, | Performed by: OPTOMETRIST

## 2022-05-26 PROCEDURE — 99999 PR PBB SHADOW E&M-EST. PATIENT-LVL III: ICD-10-PCS | Mod: PBBFAC,,, | Performed by: OPTOMETRIST

## 2022-05-26 PROCEDURE — 99213 OFFICE O/P EST LOW 20 MIN: CPT | Mod: PBBFAC | Performed by: OPTOMETRIST

## 2022-05-26 PROCEDURE — 92014 COMPRE OPH EXAM EST PT 1/>: CPT | Mod: S$PBB,,, | Performed by: OPTOMETRIST

## 2022-05-26 PROCEDURE — 92014 PR EYE EXAM, EST PATIENT,COMPREHESV: ICD-10-PCS | Mod: S$PBB,,, | Performed by: OPTOMETRIST

## 2022-05-26 NOTE — PROGRESS NOTES
HPI     Last eye exam was 1/12/22 with Dr. Kent.  Patient states no vision changes since last exam.  Patient denies diplopia, headaches, flashes/floaters, and pain.      Last edited by Mariam Noland MA on 5/26/2022  1:38 PM. (History)     HPI     Last eye exam was 1/12/22 with Dr. Kent.  Patient states no vision changes since last exam.  Patient denies diplopia, headaches, flashes/floaters, and pain.      Last edited by Mariam Noland MA on 5/26/2022  1:38 PM. (History)            Assessment /Plan     For exam results, see Encounter Report.    Pigmentary dispersion syndrome, bilateral    Open angle with borderline findings of both eyes  -     Rose Visual Field - OU - Extended - Both Eyes    Nuclear sclerosis of both eyes    Hyperopia with presbyopia of both eyes        1-2. Longstanding history of pigment dispersion syndrome and followed by Dr. Marin. Has never been diagnosed and treated for glaucoma--only suspect.  IOPs normal--low teens.  HVD and OCT normal OU.  Continue to monitor with no treatment.    HVF: 5/26/22  OCT: 5/26/22  DFE:  5/26/22  Photos: 10/05/21  Gonio:  Pachy: 531 OD, 528 OS  Initial IOPs: 14 OD, 14 OS  MHx: Pigmentary Dispersion Syndrome  FHx: None  3. Educated on cataracts and affects on vision.  Patient happy with vision.  Monitor.  4. Bifocal rx given        RTC 6 months for IOP check and Gonio.

## 2022-06-13 ENCOUNTER — PATIENT MESSAGE (OUTPATIENT)
Dept: OPTOMETRY | Facility: CLINIC | Age: 77
End: 2022-06-13
Payer: MEDICARE

## 2022-08-15 ENCOUNTER — TELEPHONE (OUTPATIENT)
Dept: DERMATOLOGY | Facility: CLINIC | Age: 77
End: 2022-08-15
Payer: MEDICARE

## 2022-08-18 ENCOUNTER — PATIENT MESSAGE (OUTPATIENT)
Dept: DERMATOLOGY | Facility: CLINIC | Age: 77
End: 2022-08-18
Payer: MEDICARE

## 2022-08-18 ENCOUNTER — TELEPHONE (OUTPATIENT)
Dept: DERMATOLOGY | Facility: CLINIC | Age: 77
End: 2022-08-18
Payer: MEDICARE

## 2022-08-18 NOTE — TELEPHONE ENCOUNTER
Ep was informed with bx proven BCC right scapula and SCC right shoulder per Dr. Padilla. Pt was scheduled for both sites on 10/10/2022 at 12:30. Pt verbally confirmed appt date and time.

## 2022-09-27 ENCOUNTER — PATIENT MESSAGE (OUTPATIENT)
Dept: OPTOMETRY | Facility: CLINIC | Age: 77
End: 2022-09-27
Payer: MEDICARE

## 2022-10-03 ENCOUNTER — PATIENT MESSAGE (OUTPATIENT)
Dept: DERMATOLOGY | Facility: CLINIC | Age: 77
End: 2022-10-03
Payer: MEDICARE

## 2022-10-10 ENCOUNTER — PROCEDURE VISIT (OUTPATIENT)
Dept: DERMATOLOGY | Facility: CLINIC | Age: 77
End: 2022-10-10
Payer: MEDICARE

## 2022-10-10 VITALS
HEIGHT: 67 IN | DIASTOLIC BLOOD PRESSURE: 72 MMHG | HEART RATE: 65 BPM | BODY MASS INDEX: 20.09 KG/M2 | SYSTOLIC BLOOD PRESSURE: 147 MMHG | WEIGHT: 128 LBS

## 2022-10-10 DIAGNOSIS — C44.519 BASAL CELL CARCINOMA, TRUNK: Primary | ICD-10-CM

## 2022-10-10 DIAGNOSIS — C44.622 SQUAMOUS CELL CARCINOMA OF RIGHT UPPER EXTREMITY: ICD-10-CM

## 2022-10-10 PROCEDURE — 13101 CMPLX RPR TRUNK 2.6-7.5 CM: CPT | Mod: 59,PBBFAC | Performed by: DERMATOLOGY

## 2022-10-10 PROCEDURE — 13121 CMPLX RPR S/A/L 2.6-7.5 CM: CPT | Mod: PBBFAC | Performed by: DERMATOLOGY

## 2022-10-10 PROCEDURE — 17313 MOHS 1 STAGE T/A/L: CPT | Mod: 76,PBBFAC | Performed by: DERMATOLOGY

## 2022-10-10 PROCEDURE — 17313 MOHS 1 STAGE T/A/L: CPT | Mod: S$PBB,,, | Performed by: DERMATOLOGY

## 2022-10-10 PROCEDURE — 13101 CMPLX RPR TRUNK 2.6-7.5 CM: CPT | Mod: 59,S$PBB,, | Performed by: DERMATOLOGY

## 2022-10-10 PROCEDURE — 17313: ICD-10-PCS | Mod: 76,S$PBB,, | Performed by: DERMATOLOGY

## 2022-10-10 PROCEDURE — 13121 CMPLX RPR S/A/L 2.6-7.5 CM: CPT | Mod: S$PBB,,, | Performed by: DERMATOLOGY

## 2022-10-10 PROCEDURE — 13121 PR RECMPL WND SCALP,EXTR 2.6-7.5 CM: ICD-10-PCS | Mod: S$PBB,,, | Performed by: DERMATOLOGY

## 2022-10-10 PROCEDURE — 99499 NO LOS: ICD-10-PCS | Mod: S$PBB,,, | Performed by: DERMATOLOGY

## 2022-10-10 PROCEDURE — 99499 UNLISTED E&M SERVICE: CPT | Mod: S$PBB,,, | Performed by: DERMATOLOGY

## 2022-10-10 PROCEDURE — 13101 PR RECMPL WND TRUNK 2.6-7.5 CM: ICD-10-PCS | Mod: 59,S$PBB,, | Performed by: DERMATOLOGY

## 2022-10-10 NOTE — PROGRESS NOTES
PROCEDURE: Mohs' Micrographic Surgery    INDICATION: Tumors with aggressive clinical behavior (rapidly growing, greater than 1 cm in diameter). Tumor with ill-defined borders. Tumor with aggressive histopathology. Aggressive histopathology including sclerosing, morpheaform/infiltrating, micronodular, superficial multicentric, poorly differentiated, basosquamous, or perineural invasion. In patient with proven history of difficult or aggressive skin cancer.    REFERRING PROVIDER: Bell Padilla M.D.    CASE NUMBER:     ANESTHETIC: 4.5 cc 0.5% Lidocaine with Epi 1:200,000 mixed 1:1 with 0.5% Bupivacaine    SURGICAL PREP: Hibiclens    SURGEON: Nick Villarreal MD    ASSISTANTS: Paula Pedroza PA-C and Rk Bullard Surg Adalid    PREOPERATIVE DIAGNOSIS: basal cell carcinoma- sclerosing, infiltrating, superficial    POSTOPERATIVE DIAGNOSIS: basal cell carcinoma    PATHOLOGIC DIAGNOSIS: basal cell carcinoma- sclerosing, infiltrating, superficial    HISTOLOGY OF SPECIMENS IN FIRST STAGE:   Tumor Type:  No tumor seen.    STAGES OF MOHS' SURGERY PERFORMED: 1    TUMOR-FREE PLANE ACHIEVED: Yes    HEMOSTASIS: electrocoagulation     SPECIMENS: 2    LOCATION: right scapula. Location verified with Dr. Padilla's clinical photograph. Patient also verified location by looking at photo taken prior to procedure.     INITIAL LESION SIZE: 0.8 x 1.3 cm    FINAL DEFECT SIZE: 1.1 x 1.8 cm    WOUND REPAIR/DISPOSITION: The patient tolerated Mohs' Micrographic Surgery for a basal cell carcinoma very well. When the tumor was completely removed, a repair of the surgical defect was undertaken.    PROCEDURE: Complex Linear Repair    INDICATION: Status post Mohs' Micrographic Surgery for basal cell carcinoma.    CASE NUMBER:     SURGEON: Nick Villarreal MD    ASSISTANTS: Paula Pedroza PA-C and Rk Bullard Surg Tech    ANESTHETIC: 1.5 cc 2% Lidocaine with Epinephrine 1:100,000    SURGICAL PREP: Karley, prepped by  Rk Bullard, Surg  "Tech    LOCATION: right scapula    DEFECT SIZE: 1.1 x 1.8 cm    WOUND REPAIR/DISPOSITION:  After the patient's carcinoma had been completely removed with Mohs' Micrographic Surgery, a repair of the surgical defect was undertaken. The patient was returned to the operating suite where the area of right scapula was prepped, draped, and anesthetized in the usual sterile fashion. The wound was widely undermined in all directions. The wound was undermined to a distance at least the maximum width of the defect as measured perpendicular to the closure line along at least one entire edge of the defect, in this case 2 cm. Then, electrocoagulation was used to obtain meticulous hemostasis. 4-0 Vicryl buried vertical mattress sutures were placed into the subcutaneous and dermal plane to close the wound and jigar the cutaneous wound edge. Bilateral dog ears were identified and were removed by a standard Burow's triangle technique. The cutaneous wound edges were closed using running 4-0 Prolene suture.    The patient tolerated the procedure well.    The area was cleaned and dressed appropriately and the patient was given wound care instructions, as well as appointment for follow-up evaluation and suture removal in 14 days.    LENGTH OF REPAIR: 2.8 cm    Vitals:    10/10/22 1223 10/10/22 1506   BP: 139/63 (!) 147/72   BP Location: Right arm    Patient Position: Sitting    BP Method: Medium (Automatic)    Pulse: 81 65   Weight: 58.1 kg (128 lb)    Height: 5' 7" (1.702 m)        PROCEDURE: Mohs' Micrographic Surgery    INDICATION: Tumors with aggressive clinical behavior (rapidly growing, greater than 1 cm in diameter). Tumor with ill-defined borders. In patient with proven history of difficult or aggressive skin cancer.    REFERRING PROVIDER: Bell Padilla M.D.    CASE NUMBER:     ANESTHETIC: 4.5 cc 0.5% Lidocaine with Epi 1:200,000 mixed 1:1 with 0.5% Bupivacaine    SURGICAL PREP: Hibiclens    SURGEON: Nick Villarreal, " MD    ASSISTANTS: Paula Pedroza PA-C and Rk Bullard AYLIEN    PREOPERATIVE DIAGNOSIS: squamous cell carcinoma- well differentiated    POSTOPERATIVE DIAGNOSIS: squamous cell carcinoma    PATHOLOGIC DIAGNOSIS: squamous cell carcinoma- well differentiated    HISTOLOGY OF SPECIMENS IN FIRST STAGE:   Tumor Type:  No tumor seen.    STAGES OF MOHS' SURGERY PERFORMED: 1    TUMOR-FREE PLANE ACHIEVED: Yes    HEMOSTASIS: electrocoagulation     SPECIMENS: 2    LOCATION: right shoulder (posterior) Location verified with Dr. Padilla's clinical photograph. Patient also verified location by looking at photo taken prior to procedure.     INITIAL LESION SIZE: 1.0 x 1.3 cm    FINAL DEFECT SIZE: 1.5 x 2.0 cm    WOUND REPAIR/DISPOSITION: The patient tolerated Mohs' Micrographic Surgery for a squamous cell carcinoma very well. When the tumor was completely removed, a repair of the surgical defect was undertaken.    PROCEDURE: Complex Linear Repair    INDICATION: Status post Mohs' Micrographic Surgery for squamous cell carcinoma.    CASE NUMBER:     SURGEON: Nick Villarreal MD    ASSISTANTS: Paula Pedroza PA-C and Rk Bullard [employer] Adalid    ANESTHETIC: 1.5 cc 2% Lidocaine with Epinephrine 1:100,000    SURGICAL PREP: Karley, prepped by  Rk Bullard Surg [employer]    LOCATION: right shoulder    DEFECT SIZE: 1.5 x 2.0 cm    WOUND REPAIR/DISPOSITION:  After the patient's carcinoma had been completely removed with Mohs' Micrographic Surgery, a repair of the surgical defect was undertaken. The patient was returned to the operating suite where the area of right shoulder was prepped, draped, and anesthetized in the usual sterile fashion. The wound was widely undermined in all directions. The wound was undermined to a distance at least the maximum width of the defect as measured perpendicular to the closure line along at least one entire edge of the defect, in this case 2 cm. Then, electrocoagulation was used to obtain meticulous  "hemostasis. 4-0 Vicryl buried vertical mattress sutures were placed into the subcutaneous and dermal plane to close the wound and jigar the cutaneous wound edge. Bilateral dog ears were identified and were removed by a standard Burow's triangle technique. The cutaneous wound edges were closed using running 4-0 Prolene suture.    The patient tolerated the procedure well.    The area was cleaned and dressed appropriately and the patient was given wound care instructions, as well as appointment for follow-up evaluation and suture removal in 14 days.    LENGTH OF REPAIR: 3.2 cm    Vitals:    10/10/22 1223 10/10/22 1506   BP: 139/63 (!) 147/72   BP Location: Right arm    Patient Position: Sitting    BP Method: Medium (Automatic)    Pulse: 81 65   Weight: 58.1 kg (128 lb)    Height: 5' 7" (1.702 m)        "

## 2022-10-17 ENCOUNTER — PATIENT MESSAGE (OUTPATIENT)
Dept: OPTOMETRY | Facility: CLINIC | Age: 77
End: 2022-10-17
Payer: MEDICARE

## 2022-10-18 ENCOUNTER — PATIENT MESSAGE (OUTPATIENT)
Dept: OPTOMETRY | Facility: CLINIC | Age: 77
End: 2022-10-18
Payer: MEDICARE

## 2022-10-25 ENCOUNTER — OFFICE VISIT (OUTPATIENT)
Dept: DERMATOLOGY | Facility: CLINIC | Age: 77
End: 2022-10-25
Payer: MEDICARE

## 2022-10-25 DIAGNOSIS — Z09 POSTOP CHECK: Primary | ICD-10-CM

## 2022-10-25 PROCEDURE — 99999 PR PBB SHADOW E&M-EST. PATIENT-LVL III: CPT | Mod: PBBFAC,,, | Performed by: DERMATOLOGY

## 2022-10-25 PROCEDURE — 99999 PR PBB SHADOW E&M-EST. PATIENT-LVL III: ICD-10-PCS | Mod: PBBFAC,,, | Performed by: DERMATOLOGY

## 2022-10-25 PROCEDURE — 99213 OFFICE O/P EST LOW 20 MIN: CPT | Mod: PBBFAC | Performed by: DERMATOLOGY

## 2022-10-25 PROCEDURE — 99024 PR POST-OP FOLLOW-UP VISIT: ICD-10-PCS | Mod: POP,,, | Performed by: DERMATOLOGY

## 2022-10-25 PROCEDURE — 99024 POSTOP FOLLOW-UP VISIT: CPT | Mod: POP,,, | Performed by: DERMATOLOGY

## 2022-10-25 NOTE — PROGRESS NOTES
76 y.o. male patient is here for suture removal following Mohs' surgery.    Patient reports no problems.    WOUND PE:  The R shoulder and R scapula sutures intact. Wound healing well. Good skin edges. No signs or symptoms of infection.    IMPRESSION:  Healing operative site from Mohs' surgery, BCC R scapula and SCC R shoulder s/p Mohs with CLC, postop day #14.    PLAN:  Sutures removed today by  Roxi Argueta MA .   Steri-strips applied.  Keep moist with Aquaphor.  Call if any issues arise    RTC:  In 3-6 months with Bell Padilla M.D. for skin check or sooner if new concern arises.

## 2022-11-27 ENCOUNTER — PATIENT MESSAGE (OUTPATIENT)
Dept: OPTOMETRY | Facility: CLINIC | Age: 77
End: 2022-11-27
Payer: MEDICARE

## 2022-11-30 ENCOUNTER — OFFICE VISIT (OUTPATIENT)
Dept: OPTOMETRY | Facility: CLINIC | Age: 77
End: 2022-11-30
Payer: MEDICARE

## 2022-11-30 DIAGNOSIS — H40.013 OPEN ANGLE WITH BORDERLINE FINDINGS OF BOTH EYES: ICD-10-CM

## 2022-11-30 DIAGNOSIS — H21.233 PIGMENTARY DISPERSION SYNDROME, BILATERAL: Primary | ICD-10-CM

## 2022-11-30 PROCEDURE — 92012 INTRM OPH EXAM EST PATIENT: CPT | Mod: S$PBB,,, | Performed by: OPTOMETRIST

## 2022-11-30 PROCEDURE — 99999 PR PBB SHADOW E&M-EST. PATIENT-LVL III: ICD-10-PCS | Mod: PBBFAC,,, | Performed by: OPTOMETRIST

## 2022-11-30 PROCEDURE — 92012 PR EYE EXAM, EST PATIENT,INTERMED: ICD-10-PCS | Mod: S$PBB,,, | Performed by: OPTOMETRIST

## 2022-11-30 PROCEDURE — 99999 PR PBB SHADOW E&M-EST. PATIENT-LVL III: CPT | Mod: PBBFAC,,, | Performed by: OPTOMETRIST

## 2022-11-30 PROCEDURE — 99213 OFFICE O/P EST LOW 20 MIN: CPT | Mod: PBBFAC | Performed by: OPTOMETRIST

## 2022-11-30 NOTE — PROGRESS NOTES
HPI     Glaucoma Suspect     Additional comments: 6 month IOP ck and gonio           Comments    Last eye exam was 5/26/22 with Dr. Kent.  Patient states no vision changes since last exam.   Patient denies diplopia, headaches, flashes/floaters, and pain.            Last edited by Mariam Noland MA on 11/30/2022 12:55 PM.            Assessment /Plan     For exam results, see Encounter Report.    Pigmentary dispersion syndrome, bilateral    Open angle with borderline findings of both eyes      1-2. IOPs stable OU.  Longstanding history of pigment dispersion syndrome and followed by Dr. Marin. Has never been diagnosed and treated for glaucoma--only suspect.  IOPs normal--low teens.  HVF and OCT normal OU.  Continue to monitor with no treatment.    HVF: 5/26/22  OCT: 5/26/22  DFE:  5/26/22  Photos: 10/05/21  Gonio: 11/30/22  Pachy: 531 OD, 528 OS  Initial IOPs: 14 OD, 14 OS  MHx: Pigmentary Dispersion Syndrome  FHx: None             RTC 6 months for routine w/ hvf and oct.

## 2022-12-20 NOTE — PROGRESS NOTES
Subjective:          Chief Complaint: Gabriele Hayes is a 76 y.o. male who had concerns including Pain of the Left Knee.    Gabriele Hayes is a 76 y.o. male, retiree here for evaluation of his left knee. He also complains of bilateral hip pain as well as back pain. Back pain began about 2 years ago, his hips began to cause discomfort about 18 months ago, and the left knee has been painful for about 1 year. All three areas of concern are becoming progressively worse. His back pain is a band along the low back area, both hips have pain that is located (he points) again to his low back, and his left knee pain is located over (points to) the posterior aspect. He reports that the left knee pain is a 5 /10 sharp pain today that he also describes as annoying. He reports none previous treatments. Pain is affecting ADLs and limiting desired level of activity. Denies numbness, tingling, radiation, and inability to bear weight.    He has history of Squamous Cell Carcinoma on his left lower leg as well his history of basal cell carcinoma and melanoma at various sites.     Mechanical symptoms: none  Subjective instability: (--)   Worse with activity  Better with OTC NSAIDs  Nocturnal symptoms: (--)    No previous surgeries or trauma on left knee                Review of Systems   Constitutional: Negative for fever and night sweats.   HENT:  Negative for hearing loss.    Eyes:  Negative for blurred vision and visual disturbance.   Cardiovascular:  Negative for chest pain and leg swelling.   Respiratory:  Negative for shortness of breath.    Endocrine: Negative for polyuria.   Hematologic/Lymphatic: Negative for bleeding problem.   Skin:  Negative for rash.   Musculoskeletal:  Positive for back pain and joint pain. Negative for joint swelling, muscle cramps and muscle weakness.   Gastrointestinal:  Negative for melena.   Genitourinary:  Negative for hematuria.   Neurological:  Negative for loss of balance, numbness and  paresthesias.   Psychiatric/Behavioral:  Negative for altered mental status.                  Objective:        General: Gabriele JUAREZ is well-developed, well-nourished, appears stated age, in no acute distress, alert and oriented to time, place and person.     General    Vitals reviewed.  Constitutional: He is oriented to person, place, and time. He appears well-developed and well-nourished. No distress.   HENT:   Mouth/Throat: No oropharyngeal exudate.   Eyes: Right eye exhibits no discharge. Left eye exhibits no discharge.   Cardiovascular:  Normal rate.            Pulmonary/Chest: Effort normal and breath sounds normal. No respiratory distress.   Neurological: He is alert and oriented to person, place, and time. He has normal reflexes. No cranial nerve deficit. Coordination normal.   Psychiatric: He has a normal mood and affect. His behavior is normal. Judgment and thought content normal.     General Musculoskeletal Exam   Gait: normal   Pelvic Obliquity: none    Right Ankle/Foot Exam     Tests   Heel Walk: able to perform  Tiptoe Walk: able to perform  Single Heel Rise: able to perform  Double Heel Rise: unable to perform double heel rise    Left Ankle/Foot Exam     Tests   Heel Walk: able to perform  Tiptoe Walk: able to perform  Single Heel Rise: able to perform  Double Heel Rise: able to perform    Right Knee Exam     Inspection   Alignment:  normal  Erythema: absent  Scars: absent  Swelling: absent  Effusion: absent  Deformity: absent  Bruising: absent    Tenderness   The patient is experiencing no tenderness.     Range of Motion   Extension:  0   Flexion:  150     Tests   Meniscus   Jim:  Medial - negative Lateral - negative  Ligament Examination   Lachman: normal (-1 to 2mm)   PCL-Posterior Drawer: normal (0 to 2mm)     MCL - Valgus: normal (0 to 2mm)  LCL - Varus: normal  Pivot Shift: normal (Equal)  Reverse Pivot Shift: normal (Equal)  Dial Test at 30 degrees: normal (< 5 degrees)  Dial Test at 90  degrees: normal (< 5 degrees)  Posterior Sag Test: negative  Posterolateral Corner: stable  Patella   Patellar apprehension: negative  Passive Patellar Tilt: neutral  Patellar Tracking: normal  Patellar Glide (quadrants): Lateral - 1   Medial - 2  Q-Angle at 90 degrees: normal  Patellar Grind: negative  J-Sign: none    Other   Meniscal Cyst: absent  Popliteal (Baker's) Cyst: absent  Sensation: normal    Comments:  Pretibial Edema +2    Left Knee Exam     Inspection   Alignment:  normal  Erythema: absent  Scars: absent  Swelling: absent  Effusion: absent  Deformity: absent  Bruising: absent    Tenderness   The patient is experiencing no tenderness.     Crepitus   The patient has crepitus of the patella.    Range of Motion   Extension:  0   Flexion:  140 (135)     Tests   Meniscus   Jim:  Medial - positive Lateral - negative  Stability   Lachman: normal (-1 to 2mm)   PCL-Posterior Drawer: normal (0 to 2mm)  MCL - Valgus: normal (0 to 2mm)  LCL - Varus: normal (0 to 2mm)  Pivot Shift: normal (Equal)  Reverse Pivot Shift: normal (Equal)  Dial Test at 30 degrees: normal (< 5 degrees)  Dial Test at 90 degrees: normal (< 5 degrees)  Posterior Sag Test: negative  Posterolateral Corner: stable  Patella   Patellar apprehension: negative  Passive Patellar Tilt: neutral  Patellar Tracking: normal  Patellar Glide (Quadrants): Lateral - 1 Medial - 2  Q-Angle at 90 degrees: normal  Patellar Grind: negative  J-Sign: J sign absent    Other   Meniscal Cyst: absent  Popliteal (Baker's) Cyst: absent  Sensation: normal    Comments:  Pretibial Edema +3    Right Hip Exam     Inspection   Scars: absent  Swelling: absent  Bruising: absent  No deformity of hip.  Quadriceps Atrophy:  Negative  Erythema: absent    Range of Motion   Abduction:  30 normal   Adduction:  20 normal   Extension:  0 normal   Flexion:  120 normal   External rotation:  30 normal   Internal rotation:  0 normal     Tests   Pain w/ forced internal rotation (JEANINE):  absent  Pain w/ forced external rotation (FADIR): absent  Jamey: negative  Trendelenburg Test: negative  Circumduction test: negative  Stinchfield test: negative  Log Roll: negative  Snapping Hip (internal): negative  Step-down test: negative  Resisted sit-up pain: negative  Unresisted sit-up pain: negative  Resisted sit-up pain with adductor contraction pain: negative    Other   Sensation: normal  Left Hip Exam     Inspection   Scars: absent  Swelling: absent  No deformity of hip.  Quadriceps Atrophy:  negative  Erythema: absent  Bruising: absent    Range of Motion   Abduction:  20 normal   Adduction:  20 normal   Extension:  0 normal   Flexion:  110 normal   External rotation:  30 normal   Internal rotation: 0 normal     Tests   Pain w/ forced internal rotation (JEANINE): absent  Pain w/ forced external rotation (FADIR): absent  Jamey: negative  Trendelenburg Test: negative  Circumduction test: negative  Stinchfield test: negative  Log Roll: negative  Snapping Hip (internal): negative  Step-down test: negative  Resisted sit-up pain: negative  Resisted sit-up pain with adductor contraction pain: negative  Unresisted sit-up pain: negative    Other   Sensation: normal      Back (L-Spine & T-Spine) / Neck (C-Spine) Exam   Back exam is normal.    Back (L-Spine & T-Spine) Range of Motion   Extension:  30 normal   Flexion:  60 normal   Lateral bend right:  30 normal   Lateral bend left:  30 normal   Rotation right:  10 normal   Rotation left:  10 normal     Spinal Sensation   Right Side Sensation  C-Spine Level: normal   L-Spine Level: normal  S-Spine Level: normal  T-Spine Level: normal  Left Side Sensation  C-Spine Level: normal  L-Spine Level: normal  S-Spine Level: normal  T-Spine Level: normal    Back (L-Spine & T-Spine) Tests   Right Side Tests  Popliteal Compression: negative  Femoral Stretch: negative  Squat Test: able to perform  Left Side Tests  Popliteal Compression: negative  Femoral Stretch: negative  Squat:  able to perform    Other   He has scoliosis .  Spinal Kyphosis:  Absent  Spinal Lordosis:  Absent      Muscle Strength   Right Lower Extremity   Hip Abduction: 5/5   Hip Adduction: 5/5   Hip Flexion: 5/5   Hip Extensors: 5/5  Quadriceps:  5/5   Hamstrin/5   Anterior tibial:  5/5   Gastrocsoleus:  5/5   EHL:  5/5  Left Lower Extremity   Hip Abduction: 5/5   Hip Adduction: 5/5   Hip Flexion: 5/5   Hip Extensors: 5/5  Quadriceps:  5/5   Hamstrin/5   Anterior tibial:  5/5   Gastrocsoleus:  5/5   EHL:  5/5    Reflexes     Left Side  Biceps:  2+  Triceps:  2+  Brachioradialis:  2+  Achilles:  2+  Left Pemberton's Sign:  Absent  Babinski Sign:  absent  Ankle Clonus:  absent  Quadriceps:  2+  Post Tibial:  2+    Right Side   Biceps:  2+  Triceps:  2+  Brachioradialis:  2+  Achilles:  2+  Right Pemberton's Sign:  absent  Babinski Sign:  absent  Ankle Clonus:  absent  Quadriceps:  2+  Post Tibial:  2+    Vascular Exam     Right Pulses  Dorsalis Pedis:      2+  Posterior Tibial:      2+    Carotid:                  2+    Left Pulses  Dorsalis Pedis:      2+  Posterior Tibial:      2+    Carotid:                  2+    Capillary Refill  Right Hand: normal capillary refill  Left Hand: normal capillary refill        Edema  Right Upper Leg: absent  Left Upper Leg: absent    X-ray Knee Ortho Bilateral with Flexion  Narrative: EXAMINATION:  XR KNEE ORTHO BILAT WITH FLEXION    CLINICAL HISTORY:  Pain in left knee    TECHNIQUE:  AP standing of both knees, PA flexion standing views of both knees, and Merchant views of both knees were performed.  Lateral views of both knees were also performed.    COMPARISON:  None    FINDINGS:  Mild DJD.  The medial tibiofemoral joint space is slightly narrowed bilaterally.  Mild patellofemoral misalignment.  No fracture or dislocation.  No bone destruction identified  Impression: See above    Electronically signed by: Bakari Jones MD  Date:    2022  Time:    13:53    X-ray Knee Ortho  Bilateral with Flexion  Narrative: EXAMINATION:  XR KNEE ORTHO BILAT WITH FLEXION    CLINICAL HISTORY:  Pain in left knee    TECHNIQUE:  AP standing of both knees, PA flexion standing views of both knees, and Merchant views of both knees were performed.  Lateral views of both knees were also performed.    COMPARISON:  None    FINDINGS:  Mild DJD.  The medial tibiofemoral joint space is slightly narrowed bilaterally.  Mild patellofemoral misalignment.  No fracture or dislocation.  No bone destruction identified  Impression: See above    Electronically signed by: Bakari Jones MD  Date:    12/21/2022  Time:    13:53    Radiographs ordered and reviewed today in clinic of the lumbar spine severe scoliosis, degenerative with underlying DJD demonstrates Complete bone on bone loss at the right greater than left patellofemoral compartment   knee with medial greater than lateral genu varum disease noted; spurring at the medial/lateral joint lines; genu varum deformity noted. Kellgren López 2-3 or greater noted.     Right hip greater than left hip DJD noted        Assessment:       Encounter Diagnoses   Name Primary?    Left knee pain, unspecified chronicity Yes    Chronic bilateral low back pain without sciatica     Hip pain, bilateral     Arthritis of right hip     Scoliosis of lumbar region due to degenerative disease of spine in adult     Primary osteoarthritis of both knees           Plan:       1. RTC as needed  with Dr. Alexus Carlson. IKDC, SF-12 and KOOS was not filled out today in clinic. Patient will not fill out IKDC, SF-12 and KOOS on return.    2. Medications: Refills of the following Rx were sent to patients preferred Pharmacy:  Meloxicam 7.5mg Tab    3. Physical Therapy: Continue/Begin: Begin at [facility] program       4. HEP: HEP 91955 - Alexus Carlson MD, instructed and demonstrated a Core HEP. The patient then demonstrated understanding of exercises and proper technique. This program was  performed for 15 minutes.     5. Procedures/Procedural Planning: N/A    6. DME:  [employer]    7. Work/Sport Status: Retired    8. Visit Summary: As above                          [facility] patient questionnaires have been collected today.

## 2022-12-21 ENCOUNTER — HOSPITAL ENCOUNTER (OUTPATIENT)
Dept: RADIOLOGY | Facility: HOSPITAL | Age: 77
Discharge: HOME | End: 2022-12-21
Attending: ORTHOPAEDIC SURGERY
Payer: MEDICARE

## 2022-12-21 ENCOUNTER — OFFICE VISIT (OUTPATIENT)
Dept: SPORTS MEDICINE | Facility: CLINIC | Age: 77
End: 2022-12-21
Payer: MEDICARE

## 2022-12-21 VITALS
HEIGHT: 67 IN | DIASTOLIC BLOOD PRESSURE: 81 MMHG | HEART RATE: 85 BPM | BODY MASS INDEX: 20.09 KG/M2 | SYSTOLIC BLOOD PRESSURE: 172 MMHG | WEIGHT: 128 LBS

## 2022-12-21 DIAGNOSIS — M16.11 ARTHRITIS OF RIGHT HIP: ICD-10-CM

## 2022-12-21 DIAGNOSIS — M25.551 HIP PAIN, BILATERAL: ICD-10-CM

## 2022-12-21 DIAGNOSIS — M25.562 LEFT KNEE PAIN, UNSPECIFIED CHRONICITY: Primary | ICD-10-CM

## 2022-12-21 DIAGNOSIS — M41.56 SCOLIOSIS OF LUMBAR REGION DUE TO DEGENERATIVE DISEASE OF SPINE IN ADULT: ICD-10-CM

## 2022-12-21 DIAGNOSIS — M17.0 PRIMARY OSTEOARTHRITIS OF BOTH KNEES: ICD-10-CM

## 2022-12-21 DIAGNOSIS — G89.29 CHRONIC BILATERAL LOW BACK PAIN WITHOUT SCIATICA: ICD-10-CM

## 2022-12-21 DIAGNOSIS — M25.562 LEFT KNEE PAIN, UNSPECIFIED CHRONICITY: ICD-10-CM

## 2022-12-21 DIAGNOSIS — M25.552 HIP PAIN, BILATERAL: ICD-10-CM

## 2022-12-21 DIAGNOSIS — M54.50 CHRONIC BILATERAL LOW BACK PAIN WITHOUT SCIATICA: ICD-10-CM

## 2022-12-21 DIAGNOSIS — M79.89 LEG SWELLING: ICD-10-CM

## 2022-12-21 PROCEDURE — 72100 X-RAY EXAM L-S SPINE 2/3 VWS: CPT | Mod: TC

## 2022-12-21 PROCEDURE — 73521 X-RAY EXAM HIPS BI 2 VIEWS: CPT | Mod: TC

## 2022-12-21 PROCEDURE — 97110 THERAPEUTIC EXERCISES: CPT | Mod: GP,,, | Performed by: ORTHOPAEDIC SURGERY

## 2022-12-21 PROCEDURE — 72100 X-RAY EXAM L-S SPINE 2/3 VWS: CPT | Mod: 26,,, | Performed by: RADIOLOGY

## 2022-12-21 PROCEDURE — 73564 XR KNEE ORTHO BILAT WITH FLEXION: ICD-10-PCS | Mod: 26,50,, | Performed by: RADIOLOGY

## 2022-12-21 PROCEDURE — 73564 X-RAY EXAM KNEE 4 OR MORE: CPT | Mod: 26,50,, | Performed by: RADIOLOGY

## 2022-12-21 PROCEDURE — 99204 OFFICE O/P NEW MOD 45 MIN: CPT | Mod: S$PBB,25,, | Performed by: ORTHOPAEDIC SURGERY

## 2022-12-21 PROCEDURE — 99999 PR PBB SHADOW E&M-EST. PATIENT-LVL V: ICD-10-PCS | Mod: PBBFAC,,, | Performed by: ORTHOPAEDIC SURGERY

## 2022-12-21 PROCEDURE — 73521 X-RAY EXAM HIPS BI 2 VIEWS: CPT | Mod: 26,,, | Performed by: RADIOLOGY

## 2022-12-21 PROCEDURE — 99999 PR PBB SHADOW E&M-EST. PATIENT-LVL V: CPT | Mod: PBBFAC,,, | Performed by: ORTHOPAEDIC SURGERY

## 2022-12-21 PROCEDURE — 99204 PR OFFICE/OUTPT VISIT, NEW, LEVL IV, 45-59 MIN: ICD-10-PCS | Mod: S$PBB,25,, | Performed by: ORTHOPAEDIC SURGERY

## 2022-12-21 PROCEDURE — 72100 XR LUMBAR SPINE AP AND LATERAL: ICD-10-PCS | Mod: 26,,, | Performed by: RADIOLOGY

## 2022-12-21 PROCEDURE — 99215 OFFICE O/P EST HI 40 MIN: CPT | Mod: PBBFAC | Performed by: ORTHOPAEDIC SURGERY

## 2022-12-21 PROCEDURE — 97110 PR THERAPEUTIC EXERCISES: ICD-10-PCS | Mod: GP,,, | Performed by: ORTHOPAEDIC SURGERY

## 2022-12-21 PROCEDURE — 73564 X-RAY EXAM KNEE 4 OR MORE: CPT | Mod: TC,50

## 2022-12-21 PROCEDURE — 73521 XR HIPS BILATERAL 2 VIEW INCL AP PELVIS: ICD-10-PCS | Mod: 26,,, | Performed by: RADIOLOGY

## 2022-12-21 RX ORDER — MELOXICAM 7.5 MG/1
7.5 TABLET ORAL DAILY
Qty: 30 TABLET | Refills: 1 | Status: SHIPPED | OUTPATIENT
Start: 2022-12-21 | End: 2023-05-11

## 2023-01-06 ENCOUNTER — PATIENT MESSAGE (OUTPATIENT)
Dept: SPORTS MEDICINE | Facility: CLINIC | Age: 78
End: 2023-01-06
Payer: MEDICARE

## 2023-03-29 ENCOUNTER — TELEPHONE (OUTPATIENT)
Dept: CARDIOLOGY | Facility: CLINIC | Age: 78
End: 2023-03-29
Payer: MEDICARE

## 2023-03-29 NOTE — TELEPHONE ENCOUNTER
----- Message from Lissa Cohen MA sent at 3/29/2023  4:26 PM CDT -----  Regarding: Need appointment  Pt needs  appointment to be evaluated for vein issues. He may have a varicose vein spot on his leg that is asymptomatic and would like to have a provider to take a look. He consulted Dermatology, but they believed its not a skin cancer, but may need to see someone to take a look for vascular diseases. Can someone please reach out to the pt for scheduling ? Thanks

## 2023-04-18 ENCOUNTER — OFFICE VISIT (OUTPATIENT)
Dept: CARDIOLOGY | Facility: CLINIC | Age: 78
End: 2023-04-18
Payer: MEDICARE

## 2023-04-18 VITALS
WEIGHT: 121.69 LBS | HEIGHT: 67 IN | SYSTOLIC BLOOD PRESSURE: 128 MMHG | DIASTOLIC BLOOD PRESSURE: 70 MMHG | BODY MASS INDEX: 19.1 KG/M2 | HEART RATE: 80 BPM

## 2023-04-18 DIAGNOSIS — I87.2 VENOUS INSUFFICIENCY OF LEFT LEG: ICD-10-CM

## 2023-04-18 DIAGNOSIS — I48.91 ATRIAL FIBRILLATION WITH RAPID VENTRICULAR RESPONSE: Primary | ICD-10-CM

## 2023-04-18 DIAGNOSIS — I83.93 ASYMPTOMATIC VARICOSE VEINS OF BOTH LOWER EXTREMITIES: ICD-10-CM

## 2023-04-18 PROCEDURE — 99999 PR PBB SHADOW E&M-EST. PATIENT-LVL IV: ICD-10-PCS | Mod: PBBFAC,,, | Performed by: INTERNAL MEDICINE

## 2023-04-18 PROCEDURE — 99205 PR OFFICE/OUTPT VISIT, NEW, LEVL V, 60-74 MIN: ICD-10-PCS | Mod: S$GLB,,, | Performed by: INTERNAL MEDICINE

## 2023-04-18 PROCEDURE — 3288F PR FALLS RISK ASSESSMENT DOCUMENTED: ICD-10-PCS | Mod: CPTII,S$GLB,, | Performed by: INTERNAL MEDICINE

## 2023-04-18 PROCEDURE — 3288F FALL RISK ASSESSMENT DOCD: CPT | Mod: CPTII,S$GLB,, | Performed by: INTERNAL MEDICINE

## 2023-04-18 PROCEDURE — 1101F PT FALLS ASSESS-DOCD LE1/YR: CPT | Mod: CPTII,S$GLB,, | Performed by: INTERNAL MEDICINE

## 2023-04-18 PROCEDURE — 3078F PR MOST RECENT DIASTOLIC BLOOD PRESSURE < 80 MM HG: ICD-10-PCS | Mod: CPTII,S$GLB,, | Performed by: INTERNAL MEDICINE

## 2023-04-18 PROCEDURE — 1126F AMNT PAIN NOTED NONE PRSNT: CPT | Mod: CPTII,S$GLB,, | Performed by: INTERNAL MEDICINE

## 2023-04-18 PROCEDURE — 1126F PR PAIN SEVERITY QUANTIFIED, NO PAIN PRESENT: ICD-10-PCS | Mod: CPTII,S$GLB,, | Performed by: INTERNAL MEDICINE

## 2023-04-18 PROCEDURE — 1101F PR PT FALLS ASSESS DOC 0-1 FALLS W/OUT INJ PAST YR: ICD-10-PCS | Mod: CPTII,S$GLB,, | Performed by: INTERNAL MEDICINE

## 2023-04-18 PROCEDURE — 3074F SYST BP LT 130 MM HG: CPT | Mod: CPTII,S$GLB,, | Performed by: INTERNAL MEDICINE

## 2023-04-18 PROCEDURE — 99205 OFFICE O/P NEW HI 60 MIN: CPT | Mod: S$GLB,,, | Performed by: INTERNAL MEDICINE

## 2023-04-18 PROCEDURE — 3074F PR MOST RECENT SYSTOLIC BLOOD PRESSURE < 130 MM HG: ICD-10-PCS | Mod: CPTII,S$GLB,, | Performed by: INTERNAL MEDICINE

## 2023-04-18 PROCEDURE — 3078F DIAST BP <80 MM HG: CPT | Mod: CPTII,S$GLB,, | Performed by: INTERNAL MEDICINE

## 2023-04-18 PROCEDURE — 1159F MED LIST DOCD IN RCRD: CPT | Mod: CPTII,S$GLB,, | Performed by: INTERNAL MEDICINE

## 2023-04-18 PROCEDURE — 1159F PR MEDICATION LIST DOCUMENTED IN MEDICAL RECORD: ICD-10-PCS | Mod: CPTII,S$GLB,, | Performed by: INTERNAL MEDICINE

## 2023-04-18 PROCEDURE — 99999 PR PBB SHADOW E&M-EST. PATIENT-LVL IV: CPT | Mod: PBBFAC,,, | Performed by: INTERNAL MEDICINE

## 2023-04-18 RX ORDER — METOPROLOL TARTRATE 25 MG/1
25 TABLET, FILM COATED ORAL 2 TIMES DAILY
COMMUNITY
Start: 2022-12-15

## 2023-04-18 NOTE — PROGRESS NOTES
[facility]      Chief Complaint   Patient presents with    Peripheral Vascular Disease       Patient ID: Gabriele Hayes is a 77 y.o. male with paroxysmal Afib (on Eliquis), venous insufficiency, melanoma, who presents for an initial appointment.  Pertinent history/events are as follows:     -Pt presents for evaluation of varicose veins.     HPI:  Mr. Hayes reports noting a varicose vein on the left lower leg.  He reports leg swelling for several months in 2022.  Pt underwent outside venous reflux study on 12/13/2022 which documents left GSV reflux with no evidence of DVT.      Past Medical History:   Diagnosis Date    Basal cell carcinoma     Melanoma     Pigment dispersion syndrome     Squamous cell carcinoma     Vitreous detachment of right eye      Past Surgical History:   Procedure Laterality Date    tonsils       Social History     Socioeconomic History    Marital status:    Tobacco Use    Smoking status: Never   Substance and Sexual Activity    Alcohol use: Yes     Family History   Problem Relation Age of Onset    Cataracts Neg Hx     Glaucoma Neg Hx     Macular degeneration Neg Hx        Review of patient's allergies indicates:  No Known Allergies    Medication List with Changes/Refills   Current Medications    AMLODIPINE-OLMESARTAN (URSZULA) 10-40 MG PER TABLET    Take 1 tablet by mouth once daily.    APIXABAN (ELIQUIS) 5 MG TAB    Take 5 mg by mouth 2 (two) times a day.    ASPIRIN (ECOTRIN) 81 MG EC TABLET    Take 81 mg by mouth once daily.    CA-D3-MAG-ZINC--JIM-BORON 600 MG CALCIUM- 800 UNIT-40 MG CHEW    Take by mouth once.    IBUPROFEN (ADVIL,MOTRIN) 400 MG TABLET    Take 400 mg by mouth every 6 (six) hours as needed for Other.    LUTEIN-ZEAXANTHIN ORAL    Take by mouth.    MELOXICAM (MOBIC) 7.5 MG TABLET    Take 1 tablet (7.5 mg total) by mouth once daily.    METOPROLOL TARTRATE (LOPRESSOR) 25 MG TABLET    Take 25 mg by mouth 2 (two) times a day.    MULTIVIT-MIN/FA/LYCOPEN/LUTEIN  "(ADULTS 50+ DAILY FORMULA ORAL)    Take by mouth Daily.    NIACIN 500 MG TAB    Take 500 mg by mouth Daily.    OMEGA-3 FATTY ACIDS/FISH OIL (FISH OIL-OMEGA-3 FATTY ACIDS) 300-1,000 MG CAPSULE    Take by mouth once daily.    TADALAFIL (CIALIS) 20 MG TAB    Take 20 mg by mouth as needed.    TURMERIC, BULK, MISC    by Misc.(Non-Drug; Combo Route) route.    VITAMIN D (VITAMIN D3) 1000 UNITS TAB    Take 1,000 Units by mouth once daily.    ZINC 50 MG TAB    Take by mouth.   Discontinued Medications    KETOCONAZOLE (NIZORAL) 2 % CREAM        MULTIVITAMIN CAPSULE    Take 1 capsule by mouth once daily.       Review of Systems  Constitution: Denies chills, fever, and sweats.  HENT: Denies headaches or blurry vision.  Cardiovascular: Denies chest pain or irregular heart beat.  Respiratory: Denies cough or shortness of breath.  Gastrointestinal: Denies abdominal pain, nausea, or vomiting.  Musculoskeletal: Denies muscle cramps.  Neurological: Denies dizziness or focal weakness.  Psychiatric/Behavioral: Normal mental status.  Hematologic/Lymphatic: Denies bleeding problem or easy bruising/bleeding.  Skin: Denies rash or suspicious lesions    Physical Examination  /70   Pulse 80   Ht 5' 7" (1.702 m)   Wt 55.2 kg (121 lb 11.1 oz)   BMI 19.06 kg/m²     Constitutional: No acute distress, conversant  HEENT: Sclera anicteric, Pupils equal, round and reactive to light, extraocular motions intact, Oropharynx clear  Neck: No JVD, no carotid bruits  Cardiovascular: regular rate and rhythm, no murmur, rubs or gallops, normal S1/S2  Pulmonary: Clear to auscultation bilaterally  Abdominal: Abdomen soft, nontender, nondistended, positive bowel sounds  Extremities: BLE's with prominent varicose veins and changes consistent with mild LLE lymphedema   Pulses:  Carotid pulses are 2+ on the right side, and 2+ on the left side.  Radial pulses are 2+ on the right side, and 2+ on the left side.   Femoral pulses are 2+ on the right side, and " 2+ on the left side.  Popliteal pulses are 2+ on the right side, and 2+ on the left side.   Dorsalis pedis pulses are 2+ on the right side, and 2+ on the left side.   Posterior tibial pulses are 2+ on the right side, and 2+ on the left side.    Skin: No ecchymosis, erythema, or ulcers  Psych: Alert and oriented x 3, appropriate affect  Neuro: CNII-XII intact, no focal deficits    Labs:  Most Recent Data  CBC:   Lab Results   Component Value Date    WBC 9.93 01/06/2021    HGB 13.5 (L) 01/06/2021    HCT 42.2 01/06/2021     01/06/2021    MCV 94 01/06/2021    RDW 14.9 (H) 01/06/2021     BMP:   Lab Results   Component Value Date     01/06/2021    K 4.2 01/06/2021     01/06/2021    CO2 24 01/06/2021    BUN 19 01/06/2021    CREATININE 0.8 01/06/2021    GLU 98 01/06/2021    CALCIUM 9.8 01/06/2021     LFTS;   Lab Results   Component Value Date    PROT 7.4 01/06/2021    ALBUMIN 3.9 01/06/2021    BILITOT 0.4 01/06/2021    AST 18 01/06/2021    ALKPHOS 66 01/06/2021    ALT 15 01/06/2021     COAGS: No results found for: INR, PROTIME, PTT  FLP: No results found for: CHOL, HDL, LDLCALC, TRIG, CHOLHDL  CARDIAC: No results found for: TROPONINI, CKMB, BNP    Imaging:    Assessment/Plan:  Gabriele Hayes is a 77 y.o. male with paroxysmal Afib (on Eliquis), venous insufficiency, melanoma, who presents for an initial appointment.    LLE Venous Insufficiency- Pt with venous insufficiency and mild LLE lymphedema.  He has what appears to be a thrombosed varicose vein which has healed at the lower left shin.  Given his history of skin cancer, I recommend evaluation with his dermatologist for consideration for possible biopsy of this area.  Continue graduated compression hose.  Limit sodium intake to 2000 mg daily.  Limit volume intake to 1.5 L daily.  Elevate legs when resting.      2. Paroxysmal Afib- Continue metoprolol tartrate 25 mg bid for rate control and Eliquis 5 mg bid for anticoagulation.     Follow up in 3  months    Total duration of face to face visit time 30 minutes.  Total time spent counseling greater than fifty percent of total visit time.  Counseling included discussion regarding imaging findings, diagnosis, possibilities, treatment options, risks and benefits.  The patient had many questions regarding the options and long-term effects.    Marcelo Mckinnon MD, PhD  Interventional Cardiology

## 2023-04-18 NOTE — PATIENT INSTRUCTIONS
Assessment/Plan:  Gabriele Hayes is a 77 y.o. male with paroxysmal Afib (on Eliquis), venous insufficiency, melanoma, who presents for an initial appointment.    LLE Venous Insufficiency- Pt with venous insufficiency and mild LLE lymphedema.  He has what appears to be a thrombosed varicose vein which has healed at the lower left shin.  Given his history of skin cancer, I recommend evaluation with his dermatologist for consideration for possible biopsy of this area.  Continue graduated compression hose.  Limit sodium intake to 2000 mg daily.  Limit volume intake to 1.5 L daily.  Elevate legs when resting.      2. Paroxysmal Afib- Continue metoprolol tartrate 25 mg bid for rate control and Eliquis 5 mg bid for anticoagulation.     Follow up in 3 months

## 2023-05-01 ENCOUNTER — PROCEDURE VISIT (OUTPATIENT)
Dept: DERMATOLOGY | Facility: CLINIC | Age: 78
End: 2023-05-01
Payer: MEDICARE

## 2023-05-01 VITALS
HEIGHT: 67 IN | DIASTOLIC BLOOD PRESSURE: 70 MMHG | WEIGHT: 121.69 LBS | BODY MASS INDEX: 19.1 KG/M2 | SYSTOLIC BLOOD PRESSURE: 131 MMHG | HEART RATE: 62 BPM

## 2023-05-01 DIAGNOSIS — C44.41 BASAL CELL CARCINOMA, SCALP/NECK: Primary | ICD-10-CM

## 2023-05-01 PROCEDURE — 17312: ICD-10-PCS | Mod: S$GLB,,, | Performed by: DERMATOLOGY

## 2023-05-01 PROCEDURE — 13132 CMPLX RPR F/C/C/M/N/AX/G/H/F: CPT | Mod: 51,S$GLB,, | Performed by: DERMATOLOGY

## 2023-05-01 PROCEDURE — 17312 MOHS ADDL STAGE: CPT | Mod: S$GLB,,, | Performed by: DERMATOLOGY

## 2023-05-01 PROCEDURE — 99499 UNLISTED E&M SERVICE: CPT | Mod: S$GLB,,, | Performed by: DERMATOLOGY

## 2023-05-01 PROCEDURE — 13132 PR RECMPL WND HEAD,FAC,HAND 2.6-7.5 CM: ICD-10-PCS | Mod: 51,S$GLB,, | Performed by: DERMATOLOGY

## 2023-05-01 PROCEDURE — 17311 MOHS 1 STAGE H/N/HF/G: CPT | Mod: XS,S$GLB,, | Performed by: DERMATOLOGY

## 2023-05-01 PROCEDURE — 17311: ICD-10-PCS | Mod: S$GLB,,, | Performed by: DERMATOLOGY

## 2023-05-01 PROCEDURE — 99499 NO LOS: ICD-10-PCS | Mod: S$GLB,,, | Performed by: DERMATOLOGY

## 2023-05-01 NOTE — PROGRESS NOTES
PROCEDURE: Mohs' Micrographic Surgery    INDICATION: Biopsy-proven skin cancer of cosmetically and functionally important areas, including head, neck, genital, hand, foot, or areas known for having difficulty in healing, such as the lower anterior legs. Tumor with ill-defined borders. In patient with proven history of difficult or aggressive skin cancer.    REFERRING PROVIDER: Bell Padilla M.D.    CASE NUMBER:     ANESTHETIC: 2 cc 0.5% Bupivacaine with Epi 1:200,000 mixed 1:1 with plain 1% Lidocaine    SURGICAL PREP: Hibiclens    SURGEON: Nick Villarreal MD    ASSISTANTS: Paula Pedroza PA-C and Mikala Ga Surg Tech    PREOPERATIVE DIAGNOSIS: basal cell carcinoma- nodular    POSTOPERATIVE DIAGNOSIS: basal cell carcinoma    PATHOLOGIC DIAGNOSIS: basal cell carcinoma- nodular    HISTOLOGY OF SPECIMENS IN FIRST STAGE:   Tumor Type: Epidermal margin missing.   Depth of Invasion: epidermis  Perineural Invasion: No    HISTOLOGY OF SPECIMENS IN SUBSEQUENT STAGES:  Tumor Type:  No tumor seen.    STAGES OF MOHS' SURGERY PERFORMED: 1    TUMOR-FREE PLANE ACHIEVED: Yes    HEMOSTASIS: electrocoagulation     SPECIMENS: 3 (2 in stage A and 1 in stage B)    LOCATION: right neck. Location verified with Dr. Padilla's clinical photograph. Patient also verified location with hand held mirror.    INITIAL LESION SIZE: 0.3 x 0.3 cm    FINAL DEFECT SIZE: 0.9 x 1.0 cm    WOUND REPAIR/DISPOSITION: The patient tolerated Mohs' Micrographic Surgery for a basal cell carcinoma very well. When the tumor was completely removed, a repair of the surgical defect was undertaken.    PROCEDURE: Complex Linear Repair    INDICATION: Status post Mohs' Micrographic Surgery for basal cell carcinoma.    CASE NUMBER:     SURGEON: Nick Villarreal MD    ASSISTANTS: Paula Pedroza PA-C and Mikala Ga Surg Tech    ANESTHETIC: 0.5 cc 1% Lidocaine with Epinephrine 1:100,000    SURGICAL PREP: Hibiclens, prepped by Mikala Ga Surg Tech    LOCATION:  "right neck    DEFECT SIZE: 0.9 x 1.0 cm    WOUND REPAIR/DISPOSITION:  After the patient's carcinoma had been completely removed with Mohs' Micrographic Surgery, a repair of the surgical defect was undertaken. The patient was returned to the operating suite where the area of right neck was prepped, draped, and anesthetized in the usual sterile fashion. The wound was widely undermined in all directions. The wound was undermined to a distance at least the maximum width of the defect as measured perpendicular to the closure line along at least one entire edge of the defect, in this case 1 cm. Then, electrocoagulation was used to obtain meticulous hemostasis. 4-0 Vicryl buried vertical mattress sutures were placed into the subcutaneous and dermal plane to close the wound and jigar the cutaneous wound edge. Bilateral dog ears were identified and were removed by a standard Burow's triangle technique. The cutaneous wound edges were closed using interrupted 4-0 Prolene suture.    The patient tolerated the procedure well.    The area was cleaned and dressed appropriately and the patient was given wound care instructions, as well as appointment for follow-up evaluation and suture removal in 10 days.    LENGTH OF REPAIR: 1.6 cm    Vitals:    05/01/23 1228 05/01/23 1452   BP: 132/68 131/70   BP Location: Left arm Left arm   Patient Position: Sitting Sitting   BP Method: Medium (Automatic) Small (Automatic)   Pulse: 70 62   Weight: 55.2 kg (121 lb 11.1 oz)    Height: 5' 7" (1.702 m)        PROCEDURE: Mohs' Micrographic Surgery    INDICATION: Biopsy-proven skin cancer of cosmetically and functionally important areas, including head, neck, genital, hand, foot, or areas known for having difficulty in healing, such as the lower anterior legs. Tumor with ill-defined borders. In patient with proven history of difficult or aggressive skin cancer.    REFERRING PROVIDER: Bell Padilla M.D.    CASE NUMBER:     ANESTHETIC: 3 cc 0.5% " Bupivacaine with Epi 1:200,000 mixed 1:1 with plain 1% Lidocaine    SURGICAL PREP: Hibiclens    SURGEON: Nick Villarreal MD    ASSISTANTS: Paula Pedroza PA-C and Mikala Ga Surg Adalid    PREOPERATIVE DIAGNOSIS: basal cell carcinoma- nodular    POSTOPERATIVE DIAGNOSIS: basal cell carcinoma    PATHOLOGIC DIAGNOSIS: basal cell carcinoma- nodular    HISTOLOGY OF SPECIMENS IN FIRST STAGE:   Tumor Type: No tumor seen.   Seborrheic keratosis: Abrupt proliferation of benign basaloid keratinocytes exhibiting acanthosis, papillomatosis, hyperkeratosis, and horn pseudocyst formation.    STAGES OF MOHS' SURGERY PERFORMED: 1    TUMOR-FREE PLANE ACHIEVED: Yes    HEMOSTASIS: electrocoagulation     SPECIMENS: 2     LOCATION: right posterior neck. Location verified with Dr. Padilla's clinical photograph. Patient also verified location by looking at photo taken prior to procedure.     INITIAL LESION SIZE: 0.5 x 0.5 cm    FINAL DEFECT SIZE: 1.1 x 1.1 cm    WOUND REPAIR/DISPOSITION: The patient tolerated Mohs' Micrographic Surgery for a basal cell carcinoma very well. When the tumor was completely removed, a repair of the surgical defect was undertaken.    PROCEDURE: Complex Linear Repair    INDICATION: Status post Mohs' Micrographic Surgery for basal cell carcinoma.    CASE NUMBER:     SURGEON: Nick Villarreal MD    ASSISTANTS: Paula Pedroza PA-C and Mikala Ga Surg Adalid    ANESTHETIC: 3 cc 1% Lidocaine with Epinephrine 1:100,000    SURGICAL PREP: Hibiclens, prepped by Mikala Ga Surg Tech    LOCATION: right posterior neck    DEFECT SIZE: 1.1 x 1.1 cm    WOUND REPAIR/DISPOSITION:  After the patient's carcinoma had been completely removed with Mohs' Micrographic Surgery, a repair of the surgical defect was undertaken. The patient was returned to the operating suite where the area of right posterior neck was prepped, draped, and anesthetized in the usual sterile fashion. The wound was widely undermined in all directions. The  "wound was undermined to a distance at least the maximum width of the defect as measured perpendicular to the closure line along at least one entire edge of the defect, in this case 2 cm. Then, electrocoagulation was used to obtain meticulous hemostasis. 4-0 Vicryl buried vertical mattress sutures were placed into the subcutaneous and dermal plane to close the wound and jigar the cutaneous wound edge. Bilateral dog ears were identified and were removed by a standard Burow's triangle technique. The cutaneous wound edges were closed using interrupted 4-0 Prolene suture.    The patient tolerated the procedure well.    The area was cleaned and dressed appropriately and the patient was given wound care instructions, as well as appointment for follow-up evaluation and suture removal in 10 days.    LENGTH OF REPAIR: 1.6 cm    Vitals:    05/01/23 1228 05/01/23 1452   BP: 132/68 131/70   BP Location: Left arm Left arm   Patient Position: Sitting Sitting   BP Method: Medium (Automatic) Small (Automatic)   Pulse: 70 62   Weight: 55.2 kg (121 lb 11.1 oz)    Height: 5' 7" (1.702 m)            "

## 2023-05-11 ENCOUNTER — OFFICE VISIT (OUTPATIENT)
Dept: DERMATOLOGY | Facility: CLINIC | Age: 78
End: 2023-05-11
Payer: MEDICARE

## 2023-05-11 DIAGNOSIS — Z09 POSTOP CHECK: Primary | ICD-10-CM

## 2023-05-11 PROCEDURE — 3288F FALL RISK ASSESSMENT DOCD: CPT | Mod: CPTII,S$GLB,, | Performed by: DERMATOLOGY

## 2023-05-11 PROCEDURE — 1126F AMNT PAIN NOTED NONE PRSNT: CPT | Mod: CPTII,S$GLB,, | Performed by: DERMATOLOGY

## 2023-05-11 PROCEDURE — 1159F PR MEDICATION LIST DOCUMENTED IN MEDICAL RECORD: ICD-10-PCS | Mod: CPTII,S$GLB,, | Performed by: DERMATOLOGY

## 2023-05-11 PROCEDURE — 99024 PR POST-OP FOLLOW-UP VISIT: ICD-10-PCS | Mod: S$GLB,,, | Performed by: DERMATOLOGY

## 2023-05-11 PROCEDURE — 99999 PR PBB SHADOW E&M-EST. PATIENT-LVL III: ICD-10-PCS | Mod: PBBFAC,,, | Performed by: DERMATOLOGY

## 2023-05-11 PROCEDURE — 99999 PR PBB SHADOW E&M-EST. PATIENT-LVL III: CPT | Mod: PBBFAC,,, | Performed by: DERMATOLOGY

## 2023-05-11 PROCEDURE — 1159F MED LIST DOCD IN RCRD: CPT | Mod: CPTII,S$GLB,, | Performed by: DERMATOLOGY

## 2023-05-11 PROCEDURE — 3288F PR FALLS RISK ASSESSMENT DOCUMENTED: ICD-10-PCS | Mod: CPTII,S$GLB,, | Performed by: DERMATOLOGY

## 2023-05-11 PROCEDURE — 1126F PR PAIN SEVERITY QUANTIFIED, NO PAIN PRESENT: ICD-10-PCS | Mod: CPTII,S$GLB,, | Performed by: DERMATOLOGY

## 2023-05-11 PROCEDURE — 1101F PT FALLS ASSESS-DOCD LE1/YR: CPT | Mod: CPTII,S$GLB,, | Performed by: DERMATOLOGY

## 2023-05-11 PROCEDURE — 1101F PR PT FALLS ASSESS DOC 0-1 FALLS W/OUT INJ PAST YR: ICD-10-PCS | Mod: CPTII,S$GLB,, | Performed by: DERMATOLOGY

## 2023-05-11 PROCEDURE — 99024 POSTOP FOLLOW-UP VISIT: CPT | Mod: S$GLB,,, | Performed by: DERMATOLOGY

## 2023-05-11 NOTE — PROGRESS NOTES
77 y.o. male patient is here for suture removal following Mohs' surgery.    Patient reports no problems.    WOUND PE:  The right neck and right posterior neck sutures intact. Wound healing well. Good skin edges. No signs or symptoms of infection.      IMPRESSION:  Healing operative site from Mohs' surgery BCC right neck and BCC right posterior neck, s/p Mohs with CLC, postop day #10.    PLAN:  Sutures removed today by Mikala Ga Surg Tech. Steri-strips applied.  Continue wound care.  Keep moist with Aquaphor.  Call if any concerns arise    RTC:  In 3-6 months with Bell Padilla M.D. for skin check or sooner if new concern arises.

## 2023-06-28 ENCOUNTER — TELEPHONE (OUTPATIENT)
Dept: OPTOMETRY | Facility: CLINIC | Age: 78
End: 2023-06-28
Payer: MEDICARE

## 2023-06-28 ENCOUNTER — PATIENT MESSAGE (OUTPATIENT)
Dept: OPTOMETRY | Facility: CLINIC | Age: 78
End: 2023-06-28
Payer: MEDICARE

## 2024-07-31 ENCOUNTER — TELEPHONE (OUTPATIENT)
Dept: DERMATOLOGY | Facility: CLINIC | Age: 79
End: 2024-07-31
Payer: MEDICARE

## 2024-07-31 NOTE — TELEPHONE ENCOUNTER
----- Message from Errol Richards MA sent at 7/30/2024  3:05 PM CDT -----  [phone] (Today,  2:58 PM)  Calling to schedule an appointment per skin check, history of skin cancer as soon as possible. Please call patient to schedule today needs appointment scheduled with spouse in the early afternoon.    [phone]

## 2024-10-02 ENCOUNTER — TELEPHONE (OUTPATIENT)
Dept: DERMATOLOGY | Facility: CLINIC | Age: 79
End: 2024-10-02
Payer: MEDICARE

## 2024-10-02 NOTE — TELEPHONE ENCOUNTER
Appointment has been made per pt request   ----- Message from Tech Jasimine sent at 10/2/2024  2:40 PM CDT -----  Regarding: Reschedule appt  Contact: [phone]  The patient is calling to reschedule his an his spouse MRN [id number] Robbie June appointment. Please contact patient to further discuss thank you.

## 2024-11-14 ENCOUNTER — OFFICE VISIT (OUTPATIENT)
Dept: DERMATOLOGY | Facility: CLINIC | Age: 79
End: 2024-11-14
Payer: MEDICARE

## 2024-11-14 DIAGNOSIS — Z12.83 SKIN CANCER SCREENING: ICD-10-CM

## 2024-11-14 DIAGNOSIS — L82.1 SEBORRHEIC KERATOSES: ICD-10-CM

## 2024-11-14 DIAGNOSIS — Z85.820 HISTORY OF MELANOMA: ICD-10-CM

## 2024-11-14 DIAGNOSIS — Z85.828 HISTORY OF NONMELANOMA SKIN CANCER: ICD-10-CM

## 2024-11-14 DIAGNOSIS — D48.5 NEOPLASM OF UNCERTAIN BEHAVIOR OF SKIN: Primary | ICD-10-CM

## 2024-11-14 DIAGNOSIS — L81.4 LENTIGINES: ICD-10-CM

## 2024-11-14 DIAGNOSIS — L57.0 ACTINIC KERATOSIS: ICD-10-CM

## 2024-11-14 NOTE — PATIENT INSTRUCTIONS
Biopsy Wound Care Instructions    Leave the bandage on for 24 hours without getting it wet.   Clean the area once a day with a gentle soap and water, then pat dry and apply Vaseline and a bandaid.  The site should be kept moist with Vaseline at all times to improve healing. Reapply a thick coating as needed. Do not let the site air out or form a scab, as this will delay healing and worsen scarring.  If any bleeding or oozing occurs once you return home, apply firm pressure to the area for 30 minutes straight without peeking. If bleeding continues, call the office immediately.  Please message us via [facility], call us at [phone], or return to the office at any sign of increasing redness, swelling, tenderness, pain, heat, yellow drainage/discharge, or continued bleeding.      Receiving Your Pathology Results    Your pathology results will be released to you on [facility] at the same time that Dr. Maradiaga receives them.   Dr. Maradiaga will then message you with her interpretation of the results and/or with the plan going forward.  If you do not use [facility] or if your pathology results require more of an explanation, you will receive your results via a phone call.  If 2 weeks go by and you have not received your results, please message us via [facility] or call us at [phone] to inform us.     General

## 2024-11-14 NOTE — PROGRESS NOTES
"  Patient Information  Name: Gabriele Hayes  : 1945  MRN: [id number]     Referring Physician:  No ref. provider found   Primary Care Physician:  Alessandro Faria MD   Date of Visit: 2024      Subjective:     History of Present lllness:    Gabriele Hayes is a 78 y.o. male who presents with a chief complaint of moles.  This is a high risk patient with a personal history of melanoma and NMSC who is here today to check for the development of new lesions.  Patient is here today for a "mole" check.     Today, patient has no additional complaints. Denies any new, changing, or symptomatic lesions on the skin.    Clinical documentation obtained by nursing staff reviewed.    Review of Systems    Objective:   Physical Exam   Constitutional: He appears well-developed and well-nourished. No distress.   Neurological: He is alert and oriented to person, place, and time. He is not disoriented.   Psychiatric: He has a normal mood and affect.   Skin:   Areas Examined (abnormalities noted in diagram):   Scalp / Hair Palpated and Inspected  Head / Face Inspection Performed  Neck Inspection Performed  Chest / Axilla Inspection Performed  Abdomen Inspection Performed  Genitals / Buttocks / Groin Inspection Performed  Back Inspection Performed  RUE Inspected  LUE Inspection Performed  RLE Inspected  LLE Inspection Performed  Nails and Digits Inspection Performed  Gland Inspection Performed                 Diagram Legend     Erythematous scaling macule/papule c/w actinic keratosis       Vascular papule c/w angioma      Pigmented verrucoid papule/plaque c/w seborrheic keratosis      Yellow umbilicated papule c/w sebaceous hyperplasia      Irregularly shaped tan macule c/w lentigo     1-2 mm smooth white papules consistent with Milia      Movable subcutaneous cyst with punctum c/w epidermal inclusion cyst      Subcutaneous movable cyst c/w pilar cyst      Firm pink to brown papule c/w dermatofibroma      Pedunculated fleshy " papule(s) c/w skin tag(s)      Evenly pigmented macule c/w junctional nevus     Mildly variegated pigmented, slightly irregular-bordered macule c/w mildly atypical nevus      Flesh colored to evenly pigmented papule c/w intradermal nevus       Pink pearly papule/plaque c/w basal cell carcinoma      Erythematous hyperkeratotic cursted plaque c/w SCC      Surgical scar with no sign of skin cancer recurrence      Open and closed comedones      Inflammatory papules and pustules      Verrucoid papule consistent consistent with wart     Erythematous eczematous patches and plaques     Dystrophic onycholytic nail with subungual debris c/w onychomycosis     Umbilicated papule    Erythematous-base heme-crusted tan verrucoid plaque consistent with inflamed seborrheic keratosis     Erythematous Silvery Scaling Plaque c/w Psoriasis     See annotation            [] Data reviewed  [] Prior external notes reviewed  [] Independent review of test  [] Management discussed with another provider  [] Independent historian    Assessment / Plan:      Pathology Orders:       Normal Orders This Visit    Specimen to Pathology, Dermatology     Questions:    Procedure Type: Dermatology and skin neoplasms    Number of Specimens: 3    ------------------------: -------------------------    Spec 1 Procedure: Biopsy    Spec 1 Clinical Impression: r/o BCC    Spec 1 Source: nasal dorsum    ------------------------: -------------------------    Spec 2 Procedure: Biopsy    Spec 2 Clinical Impression: r/o BCC    Spec 2 Source: right anterior shoulder    ------------------------: -------------------------    Spec 3 Procedure: Biopsy    Spec 3 Clinical Impression: r/o BCC    Spec 3 Source: right presternal    Release to patient:           Neoplasm of uncertain behavior of skin  -     Specimen to Pathology, Dermatology    Shave biopsy procedure note x 3:  Risk, benefits, and alternatives of biopsy are discussed with the patient, including risk of infection,  scar, recurrence, and need for additional treatment of site. The patient agrees to the procedure by verbal consent. The area is marked and prepped with alcohol.  Approximately 1 mL of lidocaine 1% with epinephrine is used for local anesthesia. A sharp blade is used to remove a portion of the lesion. The specimen is sent for pathology. Hemostasis is obtained with aluminum chloride and/or monopolar hyfrecation if needed. The area is then dressed and bandaged. The patient tolerated the procedure well without adverse event. Written instructions on wound care were given and were reviewed with the patient, who is to call for any signs of bleeding or infection. The patient will be notified of the pathology results.    Actinic keratosis  Cryosurgery procedure note:  Risk, benefits, and alternatives of cryosurgery are discussed with the patient, including but not limited to the risks of hypopigmentation, hyperpigmentation, scar, infection, recurrence of lesion(s), development of new lesion(s), and need for additional treatment of the lesion(s). Verbal consent obtained from patient. Liquid nitrogen cryosurgery applied to 11 lesion(s) to produce a freeze injury. Counseled patient that blisters may form, and instructed patient on wound care with gentle cleansing and use of Vaseline ointment to keep moist until healed. Handout was provided, and patient was instructed to return to clinic in 1-2 months if lesions do not completely resolve.    Lentigines  These are benign sun spots which should be monitored for changes. Daily sun protection will reduce the number of new lesions.   Recommend using a broad-spectrum, water-resistant sunscreen with SPF of 30 or higher--reapply every 2 hours. Seek shade, wear sun-protective clothing, and perform regular skin self-exams.    Seborrheic keratoses  These are benign, inherited growths without a malignant potential. Reassurance given to patient. No treatment is necessary.    History of  melanoma, 1980s, right forehead   - stable and chronic  An estimated 4% to 8% of patients with a history of melanoma develop a new primary melanoma, typically within the first 3 to 5 years following diagnosis. The risk of new primary melanoma is higher in the setting of increased nevus count, multiple clinically atypical/dysplastic nevi, family history of melanoma, fair skin/sun sensitivity, prior melanoma, and male sex.    Area of previous melanoma examined. Site well-healed with no signs of recurrence.  Total body skin examination performed today as noted in physical exam. Recommended continuing routine skin exams as well as routine checks with ophthalmology, dentist, and OB/GYN (if female) for any concerning lesions.  Recommend using a broad-spectrum, water-resistant sunscreen with SPF of 30 or higher--reapply every 2 hours. Seek shade, wear sun-protective clothing, and perform regular skin self-exams.    History of nonmelanoma skin cancer   - stable and chronic  Area(s) of previous nonmelanoma skin cancer evaluated with no evidence of recurrence. Reassurance provided.  Recommend using a broad-spectrum, water-resistant sunscreen with SPF of 30 or higher--reapply every 2 hours. Seek shade, wear sun-protective clothing, and perform regular skin self-exams.    Skin cancer screening  Total body skin examination performed today as noted in physical exam. Suspicious lesion(s) were noted and/or biopsied as above.  Recommend using a broad-spectrum, water-resistant sunscreen with SPF of 30 or higher--reapply every 2 hours. Seek shade, wear sun-protective clothing, and perform regular skin self-exams.         Follow up in about 6 months (around 5/14/2025) for follow up, or sooner dependent on pathology results.      Iliana Maradiaga MD, FAAD  [facility]

## 2024-11-15 ENCOUNTER — PATIENT MESSAGE (OUTPATIENT)
Dept: DERMATOLOGY | Facility: CLINIC | Age: 79
End: 2024-11-15
Payer: MEDICARE

## 2024-11-21 ENCOUNTER — PATIENT MESSAGE (OUTPATIENT)
Dept: DERMATOLOGY | Facility: CLINIC | Age: 79
End: 2024-11-21
Payer: MEDICARE

## 2024-11-21 ENCOUNTER — TELEPHONE (OUTPATIENT)
Dept: DERMATOLOGY | Facility: CLINIC | Age: 79
End: 2024-11-21
Payer: MEDICARE

## 2024-11-21 NOTE — TELEPHONE ENCOUNTER
Procedures scheduled ----- Message from Iliana Maradiaga MD sent at 11/20/2024  9:40 AM CST -----  Please let patient know that the path results showed 3 BCCs.  Please schedule surgical excision fx 2 or BCCs on shoulder and chest, and review pre- and post-op instructions with him.  Will refer to Dr. Villarrela for BCC on nose.  KK    Final Pathologic Diagnosis   1. Skin, nasal dorsum, shave biopsy:  -BASAL CELL CARCINOMA, NODULAR AND INFILTRATIVE, EXTENDING TO THE PERIPHERAL AND DEEP BIOPSY EDGES    2. Skin, right anterior shoulder, shave biopsy:  -BASAL CELL CARCINOMA, NODULAR TYPE, NARROWLY EXCISED IN THE PLANES OF SECTION EXAMINED    3. Skin, right presternal, shave biopsy:  -BASAL CELL CARCINOMA, NODULAR TYPE, EXTENDING TO THE DEEP BIOPSY EDGE

## 2024-11-21 NOTE — TELEPHONE ENCOUNTER
[facility] for patient to return call to Dr. Villarreal's office for scheduling of Mohs surgery for a BCC nasal dorsum. Referral from Dr. Iliana Maradiaga.          ----- Message from Iliana Maradiaga MD sent at 11/20/2024  9:41 AM CST -----  [facility] staff,  Please call the patient to schedule for Mohs surgery.  Thank you,  Dr. Maradiaga    Skin, nasal dorsum, shave biopsy:  -BASAL CELL CARCINOMA, NODULAR AND INFILTRATIVE, EXTENDING TO THE PERIPHERAL AND DEEP BIOPSY EDGES

## 2024-11-25 ENCOUNTER — PATIENT MESSAGE (OUTPATIENT)
Dept: DERMATOLOGY | Facility: CLINIC | Age: 79
End: 2024-11-25
Payer: MEDICARE

## 2024-11-25 ENCOUNTER — TELEPHONE (OUTPATIENT)
Dept: DERMATOLOGY | Facility: CLINIC | Age: 79
End: 2024-11-25
Payer: MEDICARE

## 2024-11-25 NOTE — TELEPHONE ENCOUNTER
Pt was scheduled for BCC right anterior shoulder and BCC right presternal on 1/15/25 at 12:30. Pt verbally confirmed appt date and time.

## 2024-11-26 ENCOUNTER — PATIENT MESSAGE (OUTPATIENT)
Dept: DERMATOLOGY | Facility: CLINIC | Age: 79
End: 2024-11-26
Payer: MEDICARE

## 2024-12-19 ENCOUNTER — OFFICE VISIT (OUTPATIENT)
Dept: OPTOMETRY | Facility: CLINIC | Age: 79
End: 2024-12-19
Payer: MEDICARE

## 2024-12-19 DIAGNOSIS — H25.13 NUCLEAR SCLEROSIS, BILATERAL: ICD-10-CM

## 2024-12-19 DIAGNOSIS — H40.013 OAG (OPEN ANGLE GLAUCOMA) SUSPECT, LOW RISK, BILATERAL: ICD-10-CM

## 2024-12-19 DIAGNOSIS — H21.233 PIGMENTARY DISPERSION SYNDROME, BILATERAL: Primary | ICD-10-CM

## 2024-12-19 PROCEDURE — 99999 PR PBB SHADOW E&M-EST. PATIENT-LVL III: CPT | Mod: PBBFAC,,, | Performed by: OPTOMETRIST

## 2024-12-19 NOTE — PROGRESS NOTES
HPI    Annual Exam and GLC suspect   Pt states vision is stable c specs     Pt denies F/F     Pt Occasional Dry/ Itchy  Gtt: No    Glaucoma Suspect/ PDS   OCT Ordered and taken today     Last edited by Moy Woods, OD on 12/19/2024  1:13 PM.            Assessment /Plan     For exam results, see Encounter Report.    Pigmentary dispersion syndrome, bilateral  -     Rose Visual Field - OU - Extended - Both Eyes; Future  -     OCT, Optic Nerve - OU - Both Eyes  OAG (open angle glaucoma) suspect, low risk, bilateral  -     Rose Visual Field - OU - Extended - Both Eyes; Future  -     OCT, Optic Nerve - OU - Both Eyes  -OCT borderline, monitor as low risk  -no gtts at this time    Nuclear sclerosis, bilateral  -Educated patient on presence of cataracts at today's exam, monitor at annual dilated fundus exam. 2-5+ years surgical estimate.      RTC 1 yr

## 2024-12-26 ENCOUNTER — PROCEDURE VISIT (OUTPATIENT)
Dept: DERMATOLOGY | Facility: CLINIC | Age: 79
End: 2024-12-26
Payer: MEDICARE

## 2024-12-26 VITALS — SYSTOLIC BLOOD PRESSURE: 179 MMHG | HEART RATE: 69 BPM | DIASTOLIC BLOOD PRESSURE: 76 MMHG

## 2024-12-26 DIAGNOSIS — C44.311 BASAL CELL CARCINOMA OF DORSUM OF NOSE: Primary | ICD-10-CM

## 2024-12-26 NOTE — PROGRESS NOTES
PROCEDURE: Mohs' Micrographic Surgery    INDICATION: Location in mask areas of face including central face, nose, eyelids, eyebrows, lips, chin, preauricular, temple, and ear. Biopsy-proven skin cancer of cosmetically and functionally important areas, including head, neck, genital, hand, foot, or areas known for having difficulty in healing, such as the lower anterior legs. Tumor with ill-defined borders. Tumor with aggressive histopathology. Aggressive histopathology including sclerosing, morpheaform/infiltrating, micronodular, superficial multicentric, poorly differentiated, basosquamous, or perineural invasion.    REFERRING PROVIDER: Iliana Maradiaga M.D.    CASE NUMBER:     ANESTHETIC: 1.5 cc 0.5% Lidocaine with Epi 1:200,000 mixed 1:1 with 0.5% Bupivacaine    SURGICAL PREP: Hibiclens    SURGEON: Nick Villarreal MD    ASSISTANTS: Paula Pedroza PA-C and Mikala Ga, Surg Tech    PREOPERATIVE DIAGNOSIS: basal cell carcinoma- nodular, infiltrating    POSTOPERATIVE DIAGNOSIS: basal cell carcinoma    PATHOLOGIC DIAGNOSIS: basal cell carcinoma- nodular, infiltrating    HISTOLOGY OF SPECIMENS IN FIRST STAGE:   Tumor Type: No tumor seen. Hypertrophic actinic keratosis: Keratinocyte atypia along the basal layer.    STAGES OF MOHS' SURGERY PERFORMED: 1    TUMOR-FREE PLANE ACHIEVED: Yes    HEMOSTASIS: electrocoagulation     SPECIMENS: 2     LOCATION: nasal dorsum. Location verified with Dr. Maradiaga's clinical photograph. Patient also verified location with hand held mirror.    INITIAL LESION SIZE: 0.4 x 0.6 cm    FINAL DEFECT SIZE: 0.5 x 0.9 cm    WOUND REPAIR/DISPOSITION: The patient tolerated Mohs' Micrographic Surgery for a basal cell carcinoma very well. When the tumor was completely removed, a repair of the surgical defect was undertaken.        PROCEDURE: Complex Linear Repair    INDICATION: Status post Mohs' Micrographic Surgery for basal cell carcinoma.    CASE NUMBER:     SURGEON: Nick Villarreal  MD    ASSISTANTS: Paula Pedroza PA-C and Tushar Cuevas    ANESTHETIC: 1 cc 1% Lidocaine with Epinephrine 1:100,000    SURGICAL PREP: Hibiclens, prepped by Tushar Cuevas    LOCATION: nasal dorsum    DEFECT SIZE: 0.5 x 0.9 cm    WOUND REPAIR/DISPOSITION:  After the patient's carcinoma had been completely removed with Mohs' Micrographic Surgery, a repair of the surgical defect was undertaken. The patient was returned to the operating suite where the area of nasal dorsum was prepped, draped, and anesthetized in the usual sterile fashion. The wound was widely undermined in all directions. The wound was undermined to a distance at least the maximum width of the defect as measured perpendicular to the closure line along at least one entire edge of the defect, in this case 1 cm. Then, electrocoagulation was used to obtain meticulous hemostasis. 5-0 Vicryl buried vertical mattress sutures were placed into the subcutaneous and dermal plane to close the wound and jigar the cutaneous wound edge. Bilateral dog ears were identified and were removed by a standard Burow's triangle technique. The cutaneous wound edges were closed using interrupted 5-0 Prolene suture.    The patient tolerated the procedure well.    The area was cleaned and dressed appropriately and the patient was given wound care instructions, as well as appointment for follow-up evaluation and suture removal in 7 days.    LENGTH OF REPAIR: 2.2 cm    Vitals:    12/26/24 0744 12/26/24 0954   BP: (!) 166/81 (!) 179/76   BP Location: Left arm Left arm   Patient Position: Sitting Sitting   Pulse: 69 (P) 62

## 2025-01-02 ENCOUNTER — CLINICAL SUPPORT (OUTPATIENT)
Dept: DERMATOLOGY | Facility: CLINIC | Age: 80
End: 2025-01-02
Payer: MEDICARE

## 2025-01-02 DIAGNOSIS — Z48.02 VISIT FOR SUTURE REMOVAL: Primary | ICD-10-CM

## 2025-01-02 PROCEDURE — 99999 PR PBB SHADOW E&M-EST. PATIENT-LVL I: CPT | Mod: PBBFAC,,,

## 2025-01-02 NOTE — PROGRESS NOTES
79 y.o. male patient is here for suture removal following Mohs' surgery.    Patient reports no problems.    WOUND PE:  The nasal dorsum sutures intact. Wound healing well. Good skin edges. No signs or symptoms of infection.    IMPRESSION:  Healing operative site from Mohs' surgery, BCC nasal dorsum s/p Mohs with CLC, postop day #7.    PLAN:  Sutures removed today by Rosina Jackson MA. Steri-strips applied.  Continue wound care.  Keep moist with Aquaphor.    RTC:  In 2 weeks for Mohs surgery on R anterior shoulder and R presternal for BCC.

## 2025-01-16 ENCOUNTER — PROCEDURE VISIT (OUTPATIENT)
Dept: DERMATOLOGY | Facility: CLINIC | Age: 80
End: 2025-01-16
Payer: MEDICARE

## 2025-01-16 VITALS — DIASTOLIC BLOOD PRESSURE: 80 MMHG | SYSTOLIC BLOOD PRESSURE: 122 MMHG | HEART RATE: 106 BPM

## 2025-01-16 DIAGNOSIS — C44.519 BASAL CELL CARCINOMA, TRUNK: Primary | ICD-10-CM

## 2025-01-16 PROCEDURE — 17313 MOHS 1 STAGE T/A/L: CPT | Mod: S$GLB,,, | Performed by: DERMATOLOGY

## 2025-01-16 PROCEDURE — 99499 UNLISTED E&M SERVICE: CPT | Mod: S$GLB,,, | Performed by: DERMATOLOGY

## 2025-01-16 PROCEDURE — 13101 CMPLX RPR TRUNK 2.6-7.5 CM: CPT | Mod: 51,S$GLB,, | Performed by: DERMATOLOGY

## 2025-01-16 NOTE — PROGRESS NOTES
PROCEDURE: Mohs' Micrographic Surgery    INDICATION: Tumor with ill-defined borders. In patient with proven history of difficult or aggressive skin cancer.    REFERRING PROVIDER: Iliana Maradiaga M.D.    CASE NUMBER:     ANESTHETIC: 2.5 cc 0.5% Lidocaine with Epi 1:200,000 mixed 1:1 with 0.5% Bupivacaine    SURGICAL PREP: Hibiclens    SURGEON: Nick Villarreal MD    ASSISTANTS: Paula Pedroza PA-C and Rosina Jackson MA    PREOPERATIVE DIAGNOSIS: basal cell carcinoma- nodular    POSTOPERATIVE DIAGNOSIS: basal cell carcinoma    PATHOLOGIC DIAGNOSIS: basal cell carcinoma- nodular    HISTOLOGY OF SPECIMENS IN FIRST STAGE:   Tumor Type:  No tumor seen.    STAGES OF MOHS' SURGERY PERFORMED: 1    TUMOR-FREE PLANE ACHIEVED: Yes    HEMOSTASIS: electrocoagulation     SPECIMENS: 2     LOCATION: right anterior shoulder. Location verified with Dr. Maradiaga's clinical photograph. Patient also verified location with hand held mirror.    INITIAL LESION SIZE: 0.5 x 0.5 cm    FINAL DEFECT SIZE: 0.9 x 1.2 cm    WOUND REPAIR/DISPOSITION: The patient tolerated Mohs' Micrographic Surgery for a basal cell carcinoma very well. When the tumor was completely removed, a repair of the surgical defect was undertaken.    PROCEDURE: Complex Linear Repair    INDICATION: Status post Mohs' Micrographic Surgery for basal cell carcinoma.    CASE NUMBER:     SURGEON: Nick Villarreal MD    ASSISTANTS: Puala Pedroza PA-C and Mikala Ga Surg Adalid    ANESTHETIC: 1.5 cc 1% Lidocaine with Epinephrine 1:100,000    SURGICAL PREP: Hibiclens, prepped by Mikala Ga Surg Adalid    LOCATION: right anterior shoulder    DEFECT SIZE: 0.9 x 1.2 cm    WOUND REPAIR/DISPOSITION:  After the patient's carcinoma had been completely removed with Mohs' Micrographic Surgery, a repair of the surgical defect was undertaken. The patient was returned to the operating suite where the area of right anterior shoulder was prepped, draped, and anesthetized in the usual sterile  fashion. The wound was widely undermined in all directions. The wound was undermined to a distance at least the maximum width of the defect as measured perpendicular to the closure line along at least one entire edge of the defect, in this case 2 cm. Then, electrocoagulation was used to obtain meticulous hemostasis. 4-0 Vicryl buried vertical mattress sutures were placed into the subcutaneous and dermal plane to close the wound and jigar the cutaneous wound edge. Bilateral dog ears were identified and were removed by a standard Burow's triangle technique. The cutaneous wound edges were closed using running 4-0 Prolene suture.    The patient tolerated the procedure well.    The area was cleaned and dressed appropriately and the patient was given wound care instructions, as well as appointment for follow-up evaluation and suture removal in 14 days.    LENGTH OF REPAIR: 2.6 cm    Vitals:    01/16/25 1206 01/16/25 1434   BP: 135/76 122/80   BP Location: Left arm Left arm   Patient Position: Sitting Lying   Pulse: 103 106       PROCEDURE: Mohs' Micrographic Surgery    INDICATION: Tumor with ill-defined borders. In patient with proven history of difficult or aggressive skin cancer.    REFERRING PROVIDER: Iliana Maradiaga M.D.    CASE NUMBER:     ANESTHETIC: 3 cc 0.5% Lidocaine with Epi 1:200,000 mixed 1:1 with 0.5% Bupivacaine    SURGICAL PREP: Hibiclens    SURGEON: Nick Villarreal MD    ASSISTANTS: Paula Pedroza PA-C and Rosina Jackson MA    PREOPERATIVE DIAGNOSIS: basal cell carcinoma- nodular    POSTOPERATIVE DIAGNOSIS: basal cell carcinoma    PATHOLOGIC DIAGNOSIS: basal cell carcinoma- nodular    HISTOLOGY OF SPECIMENS IN FIRST STAGE:   Tumor Type:  No tumor seen.    STAGES OF MOHS' SURGERY PERFORMED: 1    TUMOR-FREE PLANE ACHIEVED: Yes    HEMOSTASIS: electrocoagulation     SPECIMENS: 2     LOCATION: right presternal. Location verified with Dr. Maradiaga's clinical photograph. Patient also verified location with  hand held mirror.    INITIAL LESION SIZE: 0.4 x 0.6 cm    FINAL DEFECT SIZE: 1.0 x 1.1 cm    WOUND REPAIR/DISPOSITION: The patient tolerated Mohs' Micrographic Surgery for a basal cell carcinoma very well. When the tumor was completely removed, a repair of the surgical defect was undertaken.    PROCEDURE: Complex Linear Repair    INDICATION: Status post Mohs' Micrographic Surgery for basal cell carcinoma.    CASE NUMBER:     SURGEON: Nick Villarreal MD    ASSISTANTS: Paula Pedroza PA-C and Mikala Ga Surg Adalid    ANESTHETIC: 1.5 cc 1% Lidocaine with Epinephrine 1:100,000    SURGICAL PREP: Hibiclens, prepped by Mikala Ga Surg Tech    LOCATION: right presternal    DEFECT SIZE: 1.0 x 1.1 cm    WOUND REPAIR/DISPOSITION:  After the patient's carcinoma had been completely removed with Mohs' Micrographic Surgery, a repair of the surgical defect was undertaken. The patient was returned to the operating suite where the area of right presternal was prepped, draped, and anesthetized in the usual sterile fashion. The wound was widely undermined in all directions. The wound was undermined to a distance at least the maximum width of the defect as measured perpendicular to the closure line along at least one entire edge of the defect, in this case 2 cm. Then, electrocoagulation was used to obtain meticulous hemostasis. 4-0 Vicryl buried vertical mattress sutures were placed into the subcutaneous and dermal plane to close the wound and jigar the cutaneous wound edge. Bilateral dog ears were identified and were removed by a standard Burow's triangle technique. The cutaneous wound edges were closed using interrupted 4-0 Prolene suture.    The patient tolerated the procedure well.    The area was cleaned and dressed appropriately and the patient was given wound care instructions, as well as appointment for follow-up evaluation and suture removal in 14 days.    LENGTH OF REPAIR: 2.8 cm    Vitals:    01/16/25 1206 01/16/25  1434   BP: 135/76 122/80   BP Location: Left arm Left arm   Patient Position: Sitting Lying   Pulse: 103 106

## 2025-01-30 ENCOUNTER — OFFICE VISIT (OUTPATIENT)
Dept: DERMATOLOGY | Facility: CLINIC | Age: 80
End: 2025-01-30
Payer: MEDICARE

## 2025-01-30 DIAGNOSIS — Z09 POSTOP CHECK: Primary | ICD-10-CM

## 2025-01-30 PROCEDURE — 1159F MED LIST DOCD IN RCRD: CPT | Mod: CPTII,S$GLB,, | Performed by: DERMATOLOGY

## 2025-01-30 PROCEDURE — 99024 POSTOP FOLLOW-UP VISIT: CPT | Mod: S$GLB,,, | Performed by: DERMATOLOGY

## 2025-01-30 PROCEDURE — 1101F PT FALLS ASSESS-DOCD LE1/YR: CPT | Mod: CPTII,S$GLB,, | Performed by: DERMATOLOGY

## 2025-01-30 PROCEDURE — 1126F AMNT PAIN NOTED NONE PRSNT: CPT | Mod: CPTII,S$GLB,, | Performed by: DERMATOLOGY

## 2025-01-30 PROCEDURE — 3288F FALL RISK ASSESSMENT DOCD: CPT | Mod: CPTII,S$GLB,, | Performed by: DERMATOLOGY

## 2025-01-30 PROCEDURE — 1160F RVW MEDS BY RX/DR IN RCRD: CPT | Mod: CPTII,S$GLB,, | Performed by: DERMATOLOGY

## 2025-01-30 PROCEDURE — 99999 PR PBB SHADOW E&M-EST. PATIENT-LVL II: CPT | Mod: PBBFAC,,, | Performed by: DERMATOLOGY

## 2025-01-30 NOTE — PROGRESS NOTES
79 y.o. male patient is here for suture removal following Mohs' surgery.    Patient reports no problems with right anterior shoulder or right presternal.    WOUND PE:  The right anterior shoulder and right presternal sutures intact. Wound healing well. Good skin edges. No signs or symptoms of infection.      IMPRESSION:  Healing operative site from Mohs' surgery BCC, right anterior shoulder and right presternal s/p Mohs with CLC, postop day # 14.    PLAN:  Sutures removed today by  Sandi Veras MA . Steri-strips applied.  Continue wound care.  Keep moist with Aquaphor.  Call if any issues arise    RTC:  In 3-6 months with Iliana Maradiaga M.D. for skin check or sooner if new concern arises.